# Patient Record
Sex: MALE | Race: WHITE | NOT HISPANIC OR LATINO | Employment: FULL TIME
[De-identification: names, ages, dates, MRNs, and addresses within clinical notes are randomized per-mention and may not be internally consistent; named-entity substitution may affect disease eponyms.]

---

## 2017-03-10 ENCOUNTER — RECORDS - HEALTHEAST (OUTPATIENT)
Dept: ADMINISTRATIVE | Facility: OTHER | Age: 32
End: 2017-03-10

## 2017-09-21 ENCOUNTER — COMMUNICATION - HEALTHEAST (OUTPATIENT)
Dept: SCHEDULING | Facility: CLINIC | Age: 32
End: 2017-09-21

## 2018-05-31 ENCOUNTER — OFFICE VISIT - HEALTHEAST (OUTPATIENT)
Dept: FAMILY MEDICINE | Facility: CLINIC | Age: 33
End: 2018-05-31

## 2018-05-31 ENCOUNTER — RECORDS - HEALTHEAST (OUTPATIENT)
Dept: ADMINISTRATIVE | Facility: OTHER | Age: 33
End: 2018-05-31

## 2018-05-31 ENCOUNTER — COMMUNICATION - HEALTHEAST (OUTPATIENT)
Dept: SCHEDULING | Facility: CLINIC | Age: 33
End: 2018-05-31

## 2018-05-31 DIAGNOSIS — R10.9 ABDOMINAL PAIN: ICD-10-CM

## 2018-05-31 LAB
ALBUMIN UR-MCNC: NEGATIVE MG/DL
APPEARANCE UR: CLEAR
BACTERIA #/AREA URNS HPF: ABNORMAL HPF
BILIRUB UR QL STRIP: NEGATIVE
COLOR UR AUTO: YELLOW
GLUCOSE UR STRIP-MCNC: NEGATIVE MG/DL
HGB UR QL STRIP: ABNORMAL
KETONES UR STRIP-MCNC: NEGATIVE MG/DL
LEUKOCYTE ESTERASE UR QL STRIP: NEGATIVE
MUCOUS THREADS #/AREA URNS LPF: ABNORMAL LPF
NITRATE UR QL: NEGATIVE
PH UR STRIP: 7 [PH] (ref 5–8)
RBC #/AREA URNS AUTO: ABNORMAL HPF
SP GR UR STRIP: 1.01 (ref 1–1.03)
SQUAMOUS #/AREA URNS AUTO: ABNORMAL LPF
UROBILINOGEN UR STRIP-ACNC: ABNORMAL
WBC #/AREA URNS AUTO: ABNORMAL HPF
WBC: 7 THOU/UL (ref 4–11)

## 2018-06-01 ENCOUNTER — OFFICE VISIT - HEALTHEAST (OUTPATIENT)
Dept: FAMILY MEDICINE | Facility: CLINIC | Age: 33
End: 2018-06-01

## 2018-06-01 ENCOUNTER — AMBULATORY - HEALTHEAST (OUTPATIENT)
Dept: ADMINISTRATIVE | Facility: CLINIC | Age: 33
End: 2018-06-01

## 2018-06-01 ENCOUNTER — COMMUNICATION - HEALTHEAST (OUTPATIENT)
Dept: SCHEDULING | Facility: CLINIC | Age: 33
End: 2018-06-01

## 2018-06-01 DIAGNOSIS — R10.9 RIGHT SIDED ABDOMINAL PAIN: ICD-10-CM

## 2018-06-01 DIAGNOSIS — R10.9 ABDOMINAL PAIN: ICD-10-CM

## 2019-01-08 ENCOUNTER — COMMUNICATION - HEALTHEAST (OUTPATIENT)
Dept: FAMILY MEDICINE | Facility: CLINIC | Age: 34
End: 2019-01-08

## 2019-01-24 ENCOUNTER — COMMUNICATION - HEALTHEAST (OUTPATIENT)
Dept: TELEHEALTH | Facility: CLINIC | Age: 34
End: 2019-01-24

## 2019-01-24 ENCOUNTER — COMMUNICATION - HEALTHEAST (OUTPATIENT)
Dept: HEALTH INFORMATION MANAGEMENT | Facility: CLINIC | Age: 34
End: 2019-01-24

## 2019-08-05 ENCOUNTER — OFFICE VISIT (OUTPATIENT)
Dept: ORTHOPEDICS | Facility: CLINIC | Age: 34
End: 2019-08-05
Payer: COMMERCIAL

## 2019-08-05 ENCOUNTER — ANCILLARY PROCEDURE (OUTPATIENT)
Dept: GENERAL RADIOLOGY | Facility: CLINIC | Age: 34
End: 2019-08-05
Attending: FAMILY MEDICINE
Payer: COMMERCIAL

## 2019-08-05 VITALS
BODY MASS INDEX: 26.77 KG/M2 | HEIGHT: 73 IN | DIASTOLIC BLOOD PRESSURE: 71 MMHG | HEART RATE: 63 BPM | SYSTOLIC BLOOD PRESSURE: 153 MMHG | WEIGHT: 202 LBS

## 2019-08-05 DIAGNOSIS — M25.511 ACUTE PAIN OF RIGHT SHOULDER: Primary | ICD-10-CM

## 2019-08-05 DIAGNOSIS — M25.511 ACUTE PAIN OF RIGHT SHOULDER: ICD-10-CM

## 2019-08-05 ASSESSMENT — MIFFLIN-ST. JEOR: SCORE: 1925.15

## 2019-08-05 NOTE — PROGRESS NOTES
"      SPORTS & ORTHOPEDIC WALK-IN VISIT 8/5/2019    Primary Care Physician: Dr. Nichols    8/3/19 - Picked daughter up to put her on his shoulder, and noticed pain immediately while lifting over his head.  Pain is located mostly anterior. He notices his back/posterior muscles tightening to adjust for the pain. Woke up with some pain/discomfort. Notices acute pain when hand is overhead but only with certain movements and not all of the time. Had some previous tendonitis from College FB but no other prior shoulder injury. No radiation of pain into arm. No tingling, numbness.     Reason for visit:     What part of your body is injured / painful?  right shoulder    What caused the injury /pain? Picking up daughter to put on shoulder    How long ago did your injury occur or pain begin? several days ago 8/3/19    What are your most bothersome symptoms? Pain    How would you characterize your symptom?  sharp    What makes your symptoms better? Rest    What makes your symptoms worse? Movement    Have you been previously seen for this problem? No    Medical History:    Any recent changes to your medical history? No    Any new medication prescribed since last visit? No    Have you had surgery on this body part before? 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 No    Do you have any vision problems? No    Do you have any chest pain or edema? No    Do you have any shortness of breath or wheezing?  No    Do you have stomach problems? No    Do you have any numbness or focal weakness? No    Do you have diabetes? No    Do you have problems with bleeding or clotting? No    Do you have an rashes or other skin lesions? No         Past Medical History, Current Medications, and Allergies are reviewed in the electronic medical record as appropriate.       EXAM:BP (!) 153/71   Pulse 63   Ht 1.854 m (6' 1\")   Wt 91.6 kg " (202 lb)   BMI 26.65 kg/m      EXAM:  Alert, pleasant and conversational    Neck with full AROM, non-tender to midline cervical and upper thoracic spine palpation, negative Spurling's.  Elbow with FROM and non-tender to palpation      right shoulder:   Skin intact. No skin changes, deformity or atrophy    AROM: Forward Flexion 180 , Abduction 180 , External rotation approximately 70  Internal rotation to T7. negative tenderness with [forward flexion and internal rotation].   symmetric ROM compared to uninjured side    Strength testing: Abduction: 5/5, Abduction with 30  horizontal flexion and internal rotation: 5/5, External rotation: 5/5, Internal rotation 5/5.   Deltoids 5/5, Biceps 5/5, Triceps 5/5,  5/5.    Palpation: negative TTP of the Acromioclavicular joint  negative TTP of Sternoclavicular joint  negative TTP of posterior glenoid  negative TTP of scapular borders  positive  TTP of the bicipital tendon.     Special Tests: negative King test  negative Neer's test.   negativeO'Finesse's test   negative Speed's test.    Neurovascularly intact bilateral upper extremities.      Imaging: xrays of right shoulder performed and reviewed independently demonstrating no acute fracture or dislocation. See EMR for formal radiology report.     Assessment: Patient is a 33 year old male with anterior right shoulder pain concerning for biceps tendinitis    Recommendations:   Reviewed imaging and assessment with patient in detail  Recommended treatment with course of nsaids, ice, and relative rest  Given HEP. Patient will call if he would like to try pt or a steroid injection  Follow up in 4-6 weeks if not better.     MD Nixon Tierney MD

## 2019-08-05 NOTE — Clinical Note
8/5/2019       RE: Erasto Torres  [address]  [facility] [address]     Dear Colleague,    Thank you for referring your patient, Erasto Torres, to the [facility] SPORTS AND ORTHOPAEDIC WALK IN CLINIC at [facility]. Please see a copy of my visit note below.          SPORTS & ORTHOPEDIC WALK-IN VISIT 8/5/2019    Primary Care Physician: Dr. Nichols    8/3/19 - Picked daughter up to put her on his shoulder, and noticed pain immediately.    Pain is located mostly anterior. He notices his back/posterior muscles tightening to adjust for the pain. Woke up with some pain/discomfort. Notices acute pain when hand is overhead.    Previous tendonitis from [employer]. Noticed mild pins and needles into his pinky (unsure if it is related).    Job is slightly affected by shoulder pain.    Reason for visit:     What part of your body is injured / painful?  right shoulder    What caused the injury /pain? Picking up daughter to put on shoulder    How long ago did your injury occur or pain begin? several days ago 8/3/19    What are your most bothersome symptoms? Pain    How would you characterize your symptom?  sharp    What makes your symptoms better? Rest    What makes your symptoms worse? Movement    Have you been previously seen for this problem? No    Medical History:    Any recent changes to your medical history? No    Any new medication prescribed since last visit? No    Have you had surgery on this body part before? 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 No    Do you have any vision problems? No    Do you have any chest pain or edema? No    Do you have any shortness of breath or wheezing?  No    Do you have stomach problems? No    Do you have any numbness or focal weakness? No    Do you have diabetes? No    Do you have problems with bleeding or clotting? No    Do you have  "an rashes or other skin lesions? No         Past Medical History, Current Medications, and Allergies are reviewed in the electronic medical record as appropriate.       EXAM:BP (!) 153/71   Pulse 63   Ht 1.854 m (6' 1\")   Wt 91.6 kg (202 lb)   BMI 26.65 kg/m       EXAM:  Alert, pleasant and conversational    Neck with full AROM, non-tender to midline cervical and upper thoracic spine palpation, negative Spurling's.  Elbow with FROM and non-tender to palpation      right shoulder:   Skin intact. No skin changes, deformity or atrophy    AROM: Forward Flexion 180 , Abduction 180 , External rotation approximately 70  Internal rotation to T7. negative tenderness with [forward flexion and internal rotation].   symmetric ROM compared to uninjured side    Strength testing: Abduction: 5/5, Abduction with 30  horizontal flexion and internal rotation: 5/5, External rotation: 5/5, Internal rotation 5/5.   Deltoids 5/5, Biceps 5/5, Triceps 5/5,  5/5.    Palpation: negative TTP of the Acromioclavicular joint  negative TTP of Sternoclavicular joint  negative TTP of posterior glenoid  negative TTP of scapular borders  positive  TTP of the bicipital tendon.     Special Tests: negative King test  negative Neer's test.   negativeO'Finesse's test   negative Speed's test.    Neurovascularly intact bilateral upper extremities.      Imaging: xrays of right shoulder performed and reviewed independently demonstrating no acute fracture or dislocation. See EMR for formal radiology report.     Assessment: Patient is a 33 year old male with anterior right shoulder pain concerning for biceps tendinitis    Recommendations: ***      Nxion Rand MD                Again, thank you for allowing me to participate in the care of your patient.      Sincerely,    Nixon Rand MD    "

## 2019-10-07 ENCOUNTER — COMMUNICATION - HEALTHEAST (OUTPATIENT)
Dept: SCHEDULING | Facility: CLINIC | Age: 34
End: 2019-10-07

## 2019-10-08 ENCOUNTER — OFFICE VISIT - HEALTHEAST (OUTPATIENT)
Dept: FAMILY MEDICINE | Facility: CLINIC | Age: 34
End: 2019-10-08

## 2019-10-08 DIAGNOSIS — R53.83 FATIGUE, UNSPECIFIED TYPE: ICD-10-CM

## 2019-10-08 DIAGNOSIS — R50.9 FEVER, UNSPECIFIED FEVER CAUSE: ICD-10-CM

## 2019-10-08 LAB
BASOPHILS # BLD AUTO: 0 THOU/UL (ref 0–0.2)
BASOPHILS NFR BLD AUTO: 1 % (ref 0–2)
EOSINOPHIL # BLD AUTO: 0.3 THOU/UL (ref 0–0.4)
EOSINOPHIL NFR BLD AUTO: 6 % (ref 0–6)
ERYTHROCYTE [DISTWIDTH] IN BLOOD BY AUTOMATED COUNT: 12.5 % (ref 11–14.5)
HCT VFR BLD AUTO: 41.1 % (ref 40–54)
HGB BLD-MCNC: 14 G/DL (ref 14–18)
LYMPHOCYTES # BLD AUTO: 0.9 THOU/UL (ref 0.8–4.4)
LYMPHOCYTES NFR BLD AUTO: 20 % (ref 20–40)
MCH RBC QN AUTO: 28 PG (ref 27–34)
MCHC RBC AUTO-ENTMCNC: 34.1 G/DL (ref 32–36)
MCV RBC AUTO: 82 FL (ref 80–100)
MONOCYTES # BLD AUTO: 0.6 THOU/UL (ref 0–0.9)
MONOCYTES NFR BLD AUTO: 13 % (ref 2–10)
NEUTROPHILS # BLD AUTO: 2.6 THOU/UL (ref 2–7.7)
NEUTROPHILS NFR BLD AUTO: 60 % (ref 50–70)
PLATELET # BLD AUTO: 213 THOU/UL (ref 140–440)
PMV BLD AUTO: 7.8 FL (ref 7–10)
RBC # BLD AUTO: 5.01 MILL/UL (ref 4.4–6.2)
WBC: 4.3 THOU/UL (ref 4–11)

## 2019-10-08 ASSESSMENT — MIFFLIN-ST. JEOR: SCORE: 1899.21

## 2020-05-13 ENCOUNTER — COMMUNICATION - HEALTHEAST (OUTPATIENT)
Dept: FAMILY MEDICINE | Facility: CLINIC | Age: 35
End: 2020-05-13

## 2020-05-13 DIAGNOSIS — Z20.822 EXPOSURE TO COVID-19 VIRUS: ICD-10-CM

## 2020-05-14 ENCOUNTER — AMBULATORY - HEALTHEAST (OUTPATIENT)
Dept: LAB | Facility: CLINIC | Age: 35
End: 2020-05-14

## 2020-05-14 DIAGNOSIS — Z20.822 EXPOSURE TO COVID-19 VIRUS: ICD-10-CM

## 2020-05-17 ENCOUNTER — COMMUNICATION - HEALTHEAST (OUTPATIENT)
Dept: FAMILY MEDICINE | Facility: CLINIC | Age: 35
End: 2020-05-17

## 2020-08-03 ENCOUNTER — COMMUNICATION - HEALTHEAST (OUTPATIENT)
Dept: FAMILY MEDICINE | Facility: CLINIC | Age: 35
End: 2020-08-03

## 2020-09-03 ENCOUNTER — OFFICE VISIT - HEALTHEAST (OUTPATIENT)
Dept: FAMILY MEDICINE | Facility: CLINIC | Age: 35
End: 2020-09-03

## 2020-09-03 DIAGNOSIS — M25.562 CHRONIC PAIN OF LEFT KNEE: ICD-10-CM

## 2020-09-03 DIAGNOSIS — G89.29 CHRONIC PAIN OF LEFT KNEE: ICD-10-CM

## 2020-09-03 ASSESSMENT — MIFFLIN-ST. JEOR: SCORE: 1878.86

## 2020-09-10 ENCOUNTER — RECORDS - HEALTHEAST (OUTPATIENT)
Dept: ADMINISTRATIVE | Facility: OTHER | Age: 35
End: 2020-09-10

## 2020-09-24 ENCOUNTER — RECORDS - HEALTHEAST (OUTPATIENT)
Dept: ADMINISTRATIVE | Facility: OTHER | Age: 35
End: 2020-09-24

## 2020-12-05 ENCOUNTER — AMBULATORY - HEALTHEAST (OUTPATIENT)
Dept: NURSING | Facility: CLINIC | Age: 35
End: 2020-12-05

## 2021-02-09 ENCOUNTER — COMMUNICATION - HEALTHEAST (OUTPATIENT)
Dept: FAMILY MEDICINE | Facility: CLINIC | Age: 36
End: 2021-02-09

## 2021-02-12 ENCOUNTER — AMBULATORY - HEALTHEAST (OUTPATIENT)
Dept: NURSING | Facility: CLINIC | Age: 36
End: 2021-02-12

## 2021-03-05 ENCOUNTER — COMMUNICATION - HEALTHEAST (OUTPATIENT)
Dept: FAMILY MEDICINE | Facility: CLINIC | Age: 36
End: 2021-03-05

## 2021-03-08 ENCOUNTER — COMMUNICATION - HEALTHEAST (OUTPATIENT)
Dept: FAMILY MEDICINE | Facility: CLINIC | Age: 36
End: 2021-03-08

## 2021-03-09 ENCOUNTER — COMMUNICATION - HEALTHEAST (OUTPATIENT)
Dept: FAMILY MEDICINE | Facility: CLINIC | Age: 36
End: 2021-03-09

## 2021-03-09 ENCOUNTER — AMBULATORY - HEALTHEAST (OUTPATIENT)
Dept: FAMILY MEDICINE | Facility: CLINIC | Age: 36
End: 2021-03-09

## 2021-03-09 ENCOUNTER — COMMUNICATION - HEALTHEAST (OUTPATIENT)
Dept: SCHEDULING | Facility: CLINIC | Age: 36
End: 2021-03-09

## 2021-03-09 DIAGNOSIS — Z71.85 VACCINE COUNSELING: ICD-10-CM

## 2021-03-10 ENCOUNTER — AMBULATORY - HEALTHEAST (OUTPATIENT)
Dept: NURSING | Facility: CLINIC | Age: 36
End: 2021-03-10

## 2021-03-10 DIAGNOSIS — Z23 ENCOUNTER FOR IMMUNIZATION: ICD-10-CM

## 2021-05-06 ENCOUNTER — OFFICE VISIT - HEALTHEAST (OUTPATIENT)
Dept: FAMILY MEDICINE | Facility: CLINIC | Age: 36
End: 2021-05-06

## 2021-05-06 ENCOUNTER — AMBULATORY - HEALTHEAST (OUTPATIENT)
Dept: FAMILY MEDICINE | Facility: CLINIC | Age: 36
End: 2021-05-06

## 2021-05-06 DIAGNOSIS — Z20.822 SUSPECTED COVID-19 VIRUS INFECTION: ICD-10-CM

## 2021-05-07 LAB
SARS-COV-2 PCR COMMENT: NORMAL
SARS-COV-2 RNA SPEC QL NAA+PROBE: NEGATIVE
SARS-COV-2 VIRUS SPECIMEN SOURCE: NORMAL

## 2021-05-08 ENCOUNTER — COMMUNICATION - HEALTHEAST (OUTPATIENT)
Dept: SCHEDULING | Facility: CLINIC | Age: 36
End: 2021-05-08

## 2021-06-01 VITALS — BODY MASS INDEX: 25.27 KG/M2 | WEIGHT: 191.5 LBS

## 2021-06-01 VITALS — BODY MASS INDEX: 25.07 KG/M2 | WEIGHT: 190 LBS

## 2021-06-02 NOTE — TELEPHONE ENCOUNTER
"RN triage call  Patient is calling to report that he has felt \"ill\" since Thursday. Body aches, feverish feelings and chills.  He can get out of bed for 20 min and has to go lay down again.    Has woken up soaking wet whether napping or sleeping at night.  No vomiting or diarrhea, no abdominal pain  No appetite  Is taking in fluids, urinating well      Does get some dizziness with getting up, has to get up slow.Has not fallen. Pulse is 72 bpm  Headaches but not today  Headaches were worse on Saturday or Sunday  Managing symptoms  with tylenol and advil  No chest pain, no shortness of breath  Does have a cough that developed last night, feels a tickle in throat and feels pain in throat with coughing. Feels the cough is from his throat not his chest.  No pain with swallowing.  Patient states that this reminds him of having [place] a few years ago.  Gave disposition to be seen tonight in 4 hours. Patient was hesitant and would rather schedule an appointment with his clinic.  Did review signs and symptoms of when to call back. Patient stated understanding.  Patient was warm transferred to scheduling and has appointment in the morning.  Francisca Navarro, RN  [facility] Triage Nurse  6:59 PM  10/7/2019        Reason for Disposition    [1] MODERATE weakness (i.e., interferes with work, school, normal activities) AND [2] cause unknown  (Exceptions: weakness with acute minor illness, or weakness from poor fluid intake)    Protocols used: WEAKNESS (GENERALIZED) AND FATIGUE-A-AH      "

## 2021-06-02 NOTE — PROGRESS NOTES
"Chief Complaint:  Chief Complaint   Patient presents with     Fatigue     Cough     Fever     Chills     Headache       HPI:   Erasto Torres is a 34 y.o. male here with chief complaint of fevers and fatigue.  Symptoms started last Thursday, about 5 days ago.  He says afternoon on Thursday he started having \"violent shivering.\"  Friday he felt achy all over.  He says normally when he gets sick it lasts a few days like this, then gets better pretty quickly.  However the achiness has persisted.  He feels very weak and tired.  He is spending most of the day in bed.  He did have a headache for 2 days but that has resolved.  He is feeling slightly dizzy when he standing up quickly.  Cough started 1 day ago.  Appetite is poor.  No sore throat.  He notes that he often works really hard and kind of gets burnt out and get sick for a few days.  He is wondering if that is what this is.  He had mononucleosis several years ago.  He says symptoms today feels somewhat similar to when he had mono in the past.  He is not on any fever reducing medicines right now.      Current Outpatient Medications:      ibuprofen (ADVIL,MOTRIN) 600 MG tablet, Take 600 mg by mouth., Disp: , Rfl:     Physical Exam:  Vitals:    10/08/19 0943   BP: 110/62   Pulse: 68   Resp: 16     Body mass index is 25.69 kg/m .    Vital signs stable as recorded above  General: Patient is alert and oriented x 3, in no apparent distress, appropriately groomed with normal affect  Eyes: PERRL, EOMI bilaterally, no nystagmus  Ears: TMs non-erythematous with good light reflex bilaterally  Throat: no erythema, edema, or exudate noted  Lymphatic: No cervical lymph node enlargement  Cardiac: Regular rate and rhythm; no murmurs  Pulmonary: Lung clear to auscultation bilaterally; no crackles, rales, rhonchi, or wheezing noted  Musculoskeletal: Normal 4/5 strength in major muscle groups in upper and lower extremities bilaterally, normal  strength  Neuro: Cranial Nerves 2 " through 12 grossly intact, negative Romberg Test, patient walks in a normal tandem gait, normal finger-to-nose cerebellar coordination testing  Abdomen: Non tender to palpation, possible minimal splenomegaly, negative Martinez's sign, no pain over McBurney's point, positive bowel sounds, no masses palpable    Results for orders placed or performed in visit on 10/08/19   HM1 (CBC with Diff)   Result Value Ref Range    WBC 4.3 4.0 - 11.0 thou/uL    RBC 5.01 4.40 - 6.20 mill/uL    Hemoglobin 14.0 14.0 - 18.0 g/dL    Hematocrit 41.1 40.0 - 54.0 %    MCV 82 80 - 100 fL    MCH 28.0 27.0 - 34.0 pg    MCHC 34.1 32.0 - 36.0 g/dL    RDW 12.5 11.0 - 14.5 %    Platelets 213 140 - 440 thou/uL    MPV 7.8 7.0 - 10.0 fL    Neutrophils % 60 50 - 70 %    Lymphocytes % 20 20 - 40 %    Monocytes % 13 (H) 2 - 10 %    Eosinophils % 6 0 - 6 %    Basophils % 1 0 - 2 %    Neutrophils Absolute 2.6 2.0 - 7.7 thou/uL    Lymphocytes Absolute 0.9 0.8 - 4.4 thou/uL    Monocytes Absolute 0.6 0.0 - 0.9 thou/uL    Eosinophils Absolute 0.3 0.0 - 0.4 thou/uL    Basophils Absolute 0.0 0.0 - 0.2 thou/uL       Assessment/Plan:  1.  Fever  2.  Fatigue  Differential includes most likely viral illness.  Exam and lab work is generally reassuring.  No acute concerns.    Discussed continued symptomatic treatment.  Past history of mono several years ago.  Patient declined chest x-ray.  He will continue to monitor symptoms.    Follow-up in 1 to 2 weeks if no better, sooner if worsening.    This dictation uses voice recognition software, which may contain typographical errors.

## 2021-06-03 VITALS
HEIGHT: 73 IN | RESPIRATION RATE: 16 BRPM | TEMPERATURE: 98 F | BODY MASS INDEX: 26.11 KG/M2 | HEART RATE: 68 BPM | SYSTOLIC BLOOD PRESSURE: 110 MMHG | WEIGHT: 197 LBS | DIASTOLIC BLOOD PRESSURE: 62 MMHG

## 2021-06-04 VITALS
DIASTOLIC BLOOD PRESSURE: 68 MMHG | WEIGHT: 194 LBS | TEMPERATURE: 97.7 F | RESPIRATION RATE: 16 BRPM | BODY MASS INDEX: 25.71 KG/M2 | HEIGHT: 73 IN | HEART RATE: 60 BPM | SYSTOLIC BLOOD PRESSURE: 104 MMHG

## 2021-06-08 NOTE — TELEPHONE ENCOUNTER
Dr. Gonzalez, can you please order this patient the serology testing so that he can get scheduled? Thank you

## 2021-06-11 NOTE — PROGRESS NOTES
Adult Physical:    CC:  cpe  Knee pain for about 2 months.    HPI:  Left knee  Clicks and pops.  Quite a bit of pain  Has taken no meds.  Sometimes cannot sleep due to pain.  Hx of FB in college but never an injury.    Past Medical History:  Past Medical History:   Diagnosis Date     Mononucleosis      Past Surgical History:  Past Surgical History:   Procedure Laterality Date     ANKLE SURGERY       ORIF FINGER / THUMB FRACTURE Right      Medicines:  Outpatient Medications Prior to Visit   Medication Sig Dispense Refill     ibuprofen (ADVIL,MOTRIN) 600 MG tablet Take 600 mg by mouth.       No facility-administered medications prior to visit.        Allergies:  No Known Allergies    Family History:  Family History   Problem Relation Age of Onset     No Medical Problems Daughter      Hypertension Mother      Hyperlipidemia Mother      Diabetes Maternal Grandmother        Social History:  Works as in school behavioral therapist.  ,   Two children.  Expecting another in February.    Social History     Socioeconomic History     Marital status:      Spouse name: Not on file     Number of children: Not on file     Years of education: Not on file     Highest education level: Not on file   Occupational History     Not on file   Social Needs     Financial resource strain: Not on file     Food insecurity     Worry: Not on file     Inability: Not on file     Transportation needs     Medical: Not on file     Non-medical: Not on file   Tobacco Use     Smoking status: Never Smoker     Smokeless tobacco: Never Used     Tobacco comment: no second hand smoke exposure   Substance and Sexual Activity     Alcohol use: No     Drug use: Not on file     Sexual activity: Yes     Partners: Female   Lifestyle     Physical activity     Days per week: Not on file     Minutes per session: Not on file     Stress: Not on file   Relationships     Social connections     Talks on phone: Not on file     Gets together: Not on file      "Attends Voodoo service: Not on file     Active member of club or organization: Not on file     Attends meetings of clubs or organizations: Not on file     Relationship status: Not on file     Intimate partner violence     Fear of current or ex partner: Not on file     Emotionally abused: Not on file     Physically abused: Not on file     Forced sexual activity: Not on file   Other Topics Concern     Not on file   Social History Narrative    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   7    Review of Systems:  10 point review ok except for HPI.      Physical Exam:  /68 (Patient Site: Right Arm, Patient Position: Sitting, Cuff Size: Adult Large)   Pulse 60   Temp 97.7  F (36.5  C) (Oral)   Resp 16   Ht 6' 1\" (1.854 m)   Wt 194 lb (88 kg)   BMI 25.60 kg/m    Gen:   Alert, not distressed  Head:   Normocephalic, without obvious abnormality, atraumatic  Eyes:   PERRL, conjunctiva/corneas clear, EOM's intact  Ears:   Normal tympanic membranes and external ear canals  Nose:    Mucosa normal, no drainage or sinus tenderness  Throat:    No erythema or exudates  Neck:    No adenopathy no nodules in thyroid, normal ROM  Lungs:    Clear to auscultation bilaterally, respirations unlabored  Chest wall:  No tenderness or deformity  Heart:     Regular, normal S1 and S2, no murmur, gallop or rub  Abdomen:  Soft, non-tender, no masses, no organomegaly  Back:    Symmetric, no curvature, ROM normal, no CVA tenderness  Extremities: Extremities normal, atraumatic, no cyanosis or edema  Skin:   Skin color, texture, turgor normal, no rashes or lesions  Lymph nodes: Cervical, and supraclavicular, nodes normal  Neurologic: CNII-XII intact.   DTRs normal and symmetric.  Symmetric strength and sensation.    Knee Left     No effusion  No erythema  No warmth    No tenderness to palpation    Lachmans: negative   Anterior drawer: negative     Varus stress: non tender  Valgus stress: nontender    Modified " Appleys:  negative     Patellar grind: negative      Immunizations Due:  He will return for flu shot later in fall.    Tobacco Cessation:   N/a    Cancer Screening:  Nothing due    Weight management:   The patient was counseled regarding nutrition and physical activity    Assessment and Plan:  Well 34 y.o. man.    1. Chronic pain of left knee  Suspect meniscal injury/bucket handle tear.  Advised follow up with Orthopedics  Referred.  - Ambulatory referral to Orthopedics

## 2021-06-15 NOTE — TELEPHONE ENCOUNTER
Pt needs order entered for 2nd covid vaccine.  Missed previously scheduled appt 3/5/21 because wife gave birth.  1st covid vaccine rec'd 21.    Pt will be bringing  to clinic today for 10 am with Dr Gonzalez.  At that time pt will confirm that an order has been entered for his own 2nd covid vaccine.    Thank you-    Adri Salas RN  [facility] Triage     Reason for Disposition    [1] Follow-up call from patient regarding patient's clinical status AND [2] information NON-URGENT    Protocols used: PCP CALL - NO TRIAGE-A-

## 2021-06-17 NOTE — PATIENT INSTRUCTIONS - HE
Dear Erasto Torrse,    Your symptoms show that you may have coronavirus (COVID-19). Your risk is very low given your vaccination status and your ill contacts have had negative tests but it is prudent to test you while we are learning more about it all.     Will I be tested for COVID-19?  We would like to test you for Covid-19 virus. I have placed orders for this test.     To schedule: go to your [employer] home page and scroll down to the section that says  You have an appointment that needs to be scheduled  and click the large green button that says  Schedule Now  and follow the steps to find the next available openings.    If you are unable to complete these [employer] scheduling steps, please call [phone] to schedule your testing.     Return to work/school/ guidance:  Please let your workplace manager and staffing office know when your quarantine ends     We can t give you an exact date as it depends on the above. You can calculate this on your own or work with your manager/staffing office to calculate this. (For example if you were exposed on 10/4, you would have to quarantine for 14 full days. That would be through 10/18. You could return on 10/19.)      If you receive a positive COVID-19 test result, follow the guidance of the those who are giving you the results. Usually the return to work is 10 (or in some cases 20 days from symptom onset.) If you work at [facility], you must also be cleared by Employee Occupational Health and Safety to return to work.        If you receive a negative COVID-19 test result and did not have a high risk exposure to someone with a known positive COVID-19 test, you can return to work once you're free of fever for 24 hours without fever-reducing medication and your symptoms are improving or resolved.      If you receive a negative COVID-19 test and If you had a high risk exposure to someone who has tested positive for COVID-19 then you can return to work 14 days  after your last contact with the positive individual    Note: If you have ongoing exposure to the covid positive person, this quarantine period may be more than 14 days. (For example, if you are continued to be exposed to your child who tested positive and cannot isolate from them, then the quarantine of 7-14 days can't start until your child is no longer contagious. This is typically 10 days from onset of the child's symptoms. So the total duration may be 17-24 days in this case.)    Sign up for [employer].   We know it's scary to hear that you might have COVID-19. We want to track your symptoms to make sure you're okay over the next 2 weeks. Please look for an email from [employer]--this is a free, online program that we'll use to keep in touch. To sign up, follow the link in the email you will receive. Learn more at [email or web address]    How can I take care of myself?    Get lots of rest. Drink extra fluids (unless a doctor has told you not to)    Take Tylenol (acetaminophen) or ibuprofen for fever or pain. If you have liver or kidney problems, ask your family doctor if it's okay to take Tylenol o ibuprofen    If you have other health problems (like cancer, heart failure, an organ transplant or severe kidney disease): Call your specialty clinic if you don't feel better in the next 2 days.    Know when to call 911. Emergency warning signs include:  o Trouble breathing or shortness of breath  o Pain or pressure in the chest that doesn't go away  o Feeling confused like you haven't felt before, or not being able to wake up  o Bluish-colored lips or face    Where can I get more information?  [facility] - About COVID-19:   [email or web address]    CDC - What to Do If You're Sick:   www.cdc.gov/coronavirus/2019-ncov/about/steps-when-sick.html        May 6, 2021  RE:  Erasto Torres                                                                                                                   [address]  [place] [address]      To whom it may concern:    I evaluated Erasto Torres on 05/06/21. Erasto Torres should be excused from work/school.     They should let their workplace manager and staffing office know when their quarantine ends.    We can not give an exact date as it depends on the information below. They can calculate this on their own or work with their manager/staffing office to calculate this. (For example if they were exposed on 10/04, they would have to quarantine for 14 full days. That would be through 10/18. They could return on 10/19.)    Quarantine Guidelines:      If patient receives a positive COVID-19 test result, they should follow the guidance of those who are giving the results. Usually the return to work is 10 (or in some cases 20 days from symptom onset.) If they work at [employer], they must be cleared by Employee Occupational Health and Safety to return to work.        If patient receives a negative COVID-19 test result and did not have a high risk exposure to someone with a known positive COVID-19 test, they can return to work once they're free of fever for 24 hours without fever-reducing medication and their symptoms are improving or resolved.      If patient receives a negative COVID-19 test and if they had a high risk exposure to someone who has tested positive for COVID-19 then they can return to work 14 days after their last contact with the positive individual    Note: If there is ongoing exposure to the covid positive person, this quarantine period may be longer than 14 days. (For example, if they are continually exposed to their child, who tested positive and cannot isolate from them, then the quarantine of 7-14 days can't start until their child is no longer contagious. This is typically 10 days from onset to the child's symptoms. So the total duration may be 17-24 days in this case.)    Sincerely,  Radha Adame,  MD

## 2021-06-18 NOTE — PROGRESS NOTES
"  Subjective:    Erasto Torres is a 32 y.o. male who presents for evaluation of follow-up walk-in care for right abdominal pain.  I reviewed walk-in care note, labs, and CT scan report today.  He presented to walk-in care yesterday with abdominal pain for 2 days.  Sometimes pain was in the epigastric area, but now it seemed more concentrated on the right side of his abdomen.  Additionally he had some loose stools.  Normal white blood cell count, blood present in urine.  CT scan was done for possible kidney stones.  Scan was grossly normal, with small possibility of inflamed appendix.  Provider reviewed results with surgeon and radiologist.  Because of his none acute symptoms, they felt that appendix finding was likely normal variant.  Provider felt that it viral gastroenteritis was probably the most likely diagnosis.  He sent patient home with symptomatic treatment.  He strongly encouraged patient to follow-up within 24 hours, and that is why he is here today.  Initially he says he feels like pain is the same today as it was yesterday.  However with reflection, he notes pain has improved a little since yesterday.  He says pain is fairly constant, worse after eating.  He does have somewhat of an appetite.  No fever, no vomiting.  Maybe having slightly fewer bowel movements than normal.  He did have a bad headache yesterday, but that has resolved completely.  He is here today with his partner, who notes that patient has a \"high pain tolerance.\"  No history of abdominal surgeries.  No pain with urination, no increased urinary frequency.  Pain has been staying in the right lateral and lower quadrants of the abdomen.  It is not moving around.      Current Outpatient Prescriptions:      ibuprofen (ADVIL,MOTRIN) 600 MG tablet, Take 600 mg by mouth., Disp: , Rfl:      Objective:   Allergies:  Review of patient's allergies indicates no known allergies.    Vitals:  Vitals:    06/01/18 1449   BP: 138/70   Pulse: 62   Temp: " 98.2  F (36.8  C)   SpO2: 97%     Body mass index is 25.07 kg/(m^2).    Vital signs reviewed.  General: Patient is alert and oriented x 3, in no apparent distress, sitting comfortably in exam room, properly groomed with normal affect  Cardiac: regular rate and rhythm, no murmurs  Pulmonary: lungs clear to auscultation bilaterally, no crackles, rales, rhonchi, or wheezing noted  Abdomen: Mild pain with palpation in the right middle to lower quadrant, otherwise no pain elsewhere, no hepatosplenomegaly, negative Martinez's sign, no pain over McBurney's point, positive bowel sounds ×4, no masses palpable  Musculoskeletal: No CVA tenderness bilaterally  Skin: Skin of the abdomen is healthy normal, no rashes, erythema, or warmth    Results for orders placed or performed in visit on 05/31/18   Urinalysis-UC if Indicated   Result Value Ref Range    Color, UA Yellow Colorless, Yellow, Straw, Light Yellow    Clarity, UA Clear Clear    Glucose, UA Negative Negative    Bilirubin, UA Negative Negative    Ketones, UA Negative Negative    Specific Gravity, UA 1.015 1.005 - 1.030    Blood, UA Moderate (!) Negative    pH, UA 7.0 5.0 - 8.0    Protein, UA Negative Negative mg/dL    Urobilinogen, UA 0.2 E.U./dL 0.2 E.U./dL, 1.0 E.U./dL    Nitrite, UA Negative Negative    Leukocytes, UA Negative Negative    Bacteria, UA Few (!) None Seen hpf    RBC, UA 3-5 (!) None Seen, 0-2 hpf    WBC, UA None Seen None Seen, 0-5 hpf    Squam Epithel, UA None Seen None Seen, 0-5 lpf    Mucus, UA Few (!) None Seen lpf   White Blood Count (WBC)   Result Value Ref Range    WBC 7.0 4.0 - 11.0 thou/uL       Assessment and Plan:   1.  Follow-up right lower quadrant pain.  Differential includes most likely viral gastroenteritis, musculoskeletal pain, versus nonspecific abdominal pain.  Less possible would be appendicitis, colitis, cholecystitis, kidney stone, UTI, intestinal bacterial infection, GERD, bowel obstruction.  No significant right lower quadrant pain  or right upper quadrant pain, pain pattern is not consistent with a kidney stone, no UTI symptoms, no significant diarrhea, positive bowel sounds on exam makes obstruction unlikely.  I reviewed with patient and his partner that he does not have an acute abdomen, which is good.  However, I am unable to find a clear source for his pain.  I discussed continued symptomatic treatments, sticking with bland diet, avoiding dairy, rest, and close monitoring.  He could try a GERD medicine to see if that improves his symptoms.  I reviewed reasons to notify us or go to ER, such as increasing abdominal pain, fever, vomiting.  I suspect pain will resolve on its own over the next several days.  He declines referral to GI today.  He will call us next week if pain is still present and could consider GI referral at that time.  He has partner are comfortable with our plan.  No further questions.    This dictation uses voice recognition software, which may contain typographical errors.

## 2021-06-18 NOTE — PROGRESS NOTES
"Subjective:      Patient ID: Erasto Torres is a 32 y.o. male.    Chief Complaint:   Chief Complaint   Patient presents with     Abdominal Pain     points to ruq pain constant when at rest but radiates across abdoment when active. Looser stools yesterday evening and again this morning last ate lunch today at about 1230pm and tolerated without issues     Headache     started this morning.         HPI: Rt sided abd pain starting yesterday, getting worse today, worse with moving around and radiates around to the other side. Some loose stools without diarrhea. Started having a headache this morning; tylenol not effective.  Located bitemporal areas.  Has not had anything like this before.  No N/V.  Ate lunch at 12:30 - tolerated normal amount.  No black/bloody stools.  No ill contacts. Ate mostly at home - no picnics, restaurants etc recently.      Past Medical History:   Diagnosis Date     Mononucleosis        Past Surgical History:   Procedure Laterality Date     ANKLE SURGERY       ORIF FINGER / THUMB FRACTURE Right        Family History   Problem Relation Age of Onset     No Medical Problems Daughter      Hypertension Mother      Hyperlipidemia Mother      Diabetes Maternal Grandmother        Social History   Substance Use Topics     Smoking status: Never Smoker     Smokeless tobacco: Never Used      Comment: no second hand smoke exposure     Alcohol use No       Review of Systems   Constitutional: Positive for appetite change. Negative for activity change, diaphoresis and fever.   HENT: Negative for congestion and ear pain.    Eyes: Negative for pain.   Respiratory: Negative for cough.    Gastrointestinal: Positive for abdominal pain. Negative for abdominal distention, blood in stool, constipation, diarrhea (Stools were \"looser than normal\", but not \"diarrhea\" per patient.), rectal pain and vomiting.   Genitourinary: Negative for decreased urine volume, difficulty urinating, dysuria, flank pain (Questionable - does " "not wrap around to true flank.  No CVA tenderness.), hematuria, penile pain and urgency.        Urine \"darker than normal.\" No elizabeth blood.     Musculoskeletal: Negative for arthralgias.   Neurological: Positive for headaches. Negative for dizziness and numbness.       Objective:     /66 (Patient Site: Right Arm, Patient Position: Sitting)  Pulse 69  Temp 98.6  F (37  C) (Oral)   Resp 16  Wt 191 lb 8 oz (86.9 kg)  SpO2 98%  BMI 25.27 kg/m2    Physical Exam   Constitutional: He is oriented to person, place, and time.   Cardiovascular: Intact distal pulses.    Pulmonary/Chest: Effort normal.   Abdominal: Soft. Bowel sounds are normal. He exhibits no distension and no mass. There is tenderness (RUQ greater than RLQ.  Negative Martinez's sign.  ). There is no rebound and no guarding.   Neurological: He is alert and oriented to person, place, and time.   Skin: No rash noted.   Psychiatric: He has a normal mood and affect.       Assessment:     Procedures    The encounter diagnosis was Abdominal pain.    Plan:       Patient's symptoms are consistent with kidney stones given that the pain tends to be colicky and he has blood in his urine; however, patient is tender in the right upper quadrant all the way down to the right lower quadrant.  Patient has not had no abdominal surgeries nor abdominal issues in the past.  No family history of kidney stones, gallstones or chronic abdominal diseases.  Discussed case with radiologist who stated that a noncontrast CT in this patient given his height and weight should see the appendix as well as a kidney stone, but not see gallstones that were not calcified.  We do not have Dramamine here for kidney stone pain.  Patient advised to be n.p.o. in case this is his appendix.      1520: Patient states his pain is now more localized in the right lower quadrant area and is \"spasming.\"    1610: Radiologist Dr. Lara called to discuss critical findings of possible early appendicitis. "  Recommended obtaining labs and discussing with the surgeon for further management.  1620: Discussed case with Dia CARMEN who will follow up with surgery and take over the case.

## 2021-06-18 NOTE — PROGRESS NOTES
Report hand off from Tatiana Bajwa given about 16:15. Patient having R sided abdominal pain since yesterday. Pain radiation was angled from the side down toward the RLQ. UA showed moderate blood, so a non-contrast abdominal CT was ordered. No sign of hydronephrosis or kidney stone was noted. The appendix did appear dilated on the CT. , radiologist, expressed concern for possible early appendicitis.     I personally spoke to , Hospitalist, recommended to speak to surgery.  I discussed this case with Dr. Erazo of surgery.  Since the patient reported improvement in his abdominal pain from a 6/10 to a 2/10 after CT he recommended getting white blood cell count.  The white count was normal at 7.0.  Abdominal exam was reassessed.  No peritoneal signs were noted, negative Rovsing's and negative obturator signs.    Recommend close follow-up with primary care tomorrow morning.  Possible differential includes viral gastroenteritis still since the diagnosis of appendicitis based on mild dilation of the appendix on noncontrast CT is weak. Patient was scheduled for reassessment in 21 hours.     At the end of the encounter, I discussed results, diagnosis, medications. Discussed red flags for immediate return to clinic/ER, as well as indications for follow up if no improvement. Patient understood and agreed to plan. Patient was stable for discharge.    Radiology:  Ct Abdomen Pelvis Without Oral Without Iv Contrast    Result Date: 5/31/2018  EXAM DATE:         05/31/2018 [facility] CT ABDOMEN, PELVIS W/O CONTRAST 5/31/2018 3:45 PM INDICATION: CT ABD PEL- WITHOUT ORAL AND IV- ABD PAIN. KIDNEY STONES TECHNIQUE: Routine CT abdomen and pelvis without oral or IV contrast. Multiplanar reformation images (MPR). Dose reduction techniques were used. COMPARISON: None. FINDINGS: LUNG BASES: Negative. ABDOMEN: Noncontrast liver, gallbladder, bile ducts, spleen, pancreas, adrenal glands, and kidneys are  negative. No urolithiasis or hydronephrosis. PELVIS: Appendix is dilated to 10 mm and devoid of intraluminal gas. Periappendiceal fat is equivocally mildly inflamed. No pneumoperitoneum or abscess. MUSCULOSKELETAL: Sharply circumscribed benign-appearing subcutaneous cyst of the left paralumbar region measures 4.6 x 1.7 cm on axial images. CONCLUSION: 1.  Findings suspicious for early acute uncomplicated appendicitis. 2.  No urolithiasis or hydronephrosis. I discussed the findings with Tatiana Bajwa CNP at 4:10 PM on 5/31/2018.       Labs:  Recent Results (from the past 24 hour(s))   White Blood Count (WBC)   Result Value Ref Range    WBC 7.0 4.0 - 11.0 thou/uL           1. Abdominal pain  Urinalysis-UC if Indicated    CT Abdomen Pelvis Without Oral Without IV Contrast    White Blood Count (WBC)         Patient Instructions   1. Follow up with primary care tomorrow at 3:00PM for re-evaluation of you abdominal pain.  2. Seek emergency medical attention if you develop high fever, severe abdominal pain, or vomiting.   3. At this time your abdominal pain may be a viral gastroenteritis. Avoiding dairy. Eat a bland diet.

## 2021-06-18 NOTE — LETTER
Letter by Vidya Abarca at      Author: Vidya Abarca Service: -- Author Type: --    Filed:  Encounter Date: 1/24/2019 Status: (Other)       January 24, 2019       Erasto Torres  [address]  [place] [address]    Dear Erasto Torres:    We are pleased to provide you with secure, online access to medical information for you and your family within [facility]. Per your request, we have expanded your account to allow access to the records of the following family members:              Debbie WATTERS Brian (privilege ends on 7/8/2027.)            Eliel VEE Brian (privilege ends on 12/16/2030.)     How Do I Log In?  1. In your Internet browser, go to [employer]  2. Log into [facility] using your [facility] Username and Password.  3. Click Sign In.        How Do I Access a Family Member's Account?  4. Select the account you want to access by clicking the [place] with the appropriate patient's name at the top of your screen.   5. You will see a disclaimer page letting you know that you will be viewing a family member's record. Review the disclaimer and then click Accept Proxy Access Disclaimer to proceed.  6. Once you switch to viewing a family member's record, you can navigate to [facility] pages the same way you would for yourself. You can return to your own account by clicking the [place] at the top of the screen with your name on it.    7. To customize the colors and names of the linked accounts, you can select Personalize from the Profile dropdown menu at the top of the screen, then click the Edit button to make changes.     Additional Information  If you have questions, visit [email or web address], e-mail [email or web address] or call [phone] to talk to our [facility] staff. Remember, [facility] is NOT to be used for urgent needs. For medical emergencies, dial 911.

## 2021-06-19 NOTE — LETTER
Letter by Olivia Hilliard PA-C at      Author: Olivia Hilliard PA-C Service: -- Author Type: --    Filed:  Encounter Date: 10/8/2019 Status: Signed         October 8, 2019     Patient: Erasto Torres   YOB: 1985   Date of Visit: 10/8/2019       To Whom it May Concern:    Erasto Torres was seen in my clinic on 10/8/2019.  Please excuse him from work from 10/7/2019 through 10/9/2019 due to illness.  He may return to work on 10/10/2019 if his symptoms improve.    If you have any questions or concerns, please don't hesitate to call.    Sincerely,         Electronically signed by Olivia Hilliard PA-C

## 2021-06-20 NOTE — LETTER
Letter by Noemí Faulkner RN at      Author: Noemí Faulkner RN Service: -- Author Type: --    Filed:  Encounter Date: 5/17/2020 Status: (Other)       5/17/2020        Erasto Torres  [address]  [place] [address]    COVID-19 Antibody Screen   Date Value Ref Range Status   05/14/2020 Negative  Final     Comment:     No COVID-19 antibodies detected.  Patients within 10 days of symptom onset for  COVID-19 may not produce sufficient levels of detectable antibodies.  Immunocompromised COVID-19 patients may take longer to develop antibodies.       You have tested NEGATIVE for COVID-19 antibodies. This suggests you have not had or been exposed to COVID-19. But it does not mean that for sure.    The test finds antibodies in most people 10 days after they get sick. For some people, it takes longer than 10 days for antibodies to show up. Others may never show antibodies against COVID-19, especially if they have weak immune systems.    If you have COVID-19 symptoms now, please stay home and away from others.     Your current symptoms may or may not be COVID-19.     What is antibody testing?  This is a kind of blood test. We take a small sample of your blood, and then test it for something called antibodies.   Your body makes antibodies to fight infection. If your blood has antibodies for a certain germ, it means youve been infected with that germ in the past.   Sometimes, antibodies stay in your body for years after youve had the infection. They can be there even if the germ didnt make you sick. They are a sign that your body fought off the infection.  Will this test find antibodies in everyone whos had COVID-19?  No. The test finds antibodies in most people 10 days after they get sick. For some people, it takes longer than 10 days for antibodies to show up. Others may never show antibodies against COVID-19, especially if they have weak immune systems.  What are the signs of COVID-19?  Signs of COVID-19 can  appear from 2 to 14 days (up to 2 weeks) after youre infected. Some people have no symptoms or only mild symptoms. Others get very sick. The most common symptoms are:      Cough    Shortness of breath or trouble breathing    Or at least 2 of these symptoms:      Fever    Chills    Repeated shaking with chills    Muscle pain    Headache    Sore throat    Losing your sense of taste or smell    You may have other symptoms. Please contact your doctor or clinic for any symptoms that worry you.    Where can I get more information?     To learn the [facility] guidelines for staying home, please visit the [place] Department of Health website at [email or web address]    To learn more about COVID-19 and how to care for yourself at home, please visit the CDC website at https://www.cdc.gov/coronavirus/2019-ncov/about/steps-when-sick.html    For more options for care at [facility], please visit our website at [email or web address]    [facility] ([facility]) COVID-19 Hotline:  [phone]      You have tested NEGATIVE for COVID-19 antibodies. This suggests you have not had or been exposed to COVID-19. But it does not mean that for sure.   The test finds antibodies in most people 10 days after they get sick. For some people, it takes longer than 10 days for antibodies to show up. Others may never show antibodies against COVID-19, especially if they have weak immune systems.  If you have COVID-19 symptoms now, please stay home and away from others.   What is antibody testing?  This is a kind of blood test. We take a small sample of your blood, and then test it for something called antibodies.   Your body makes antibodies to fight infection. If your blood has antibodies for a certain germ, it means youve been infected with that germ in the past.   Sometimes, antibodies stay in your body for years after youve had the infection. They can be there even if the  germ didnt make you sick. They are a sign that your body fought off the infection.  Will this test find antibodies in everyone whos had COVID-19?  No. The test finds antibodies in most people 10 days after they get sick. For some people, it takes longer than 10 days for antibodies to show up. Others may never show antibodies against COVID-19, especially if they have weak immune systems.  What does it mean if the test finds COVID-19 antibodies?  If we find these antibodies, it suggests:     This person has had the virus.     Their bodys immune system fought the virus.   We dont know if this will help protect someone from getting COVID-19 again. Scientists are still learning about this.  What are the signs of COVID-19?  Signs of COVID-19 can appear from 2 to 14 days (up to 2 weeks) after youre infected. Some people have no symptoms or only mild symptoms. Others get very sick. The most common symptoms are:    Cough    Shortness of breath or trouble breathing      Or at least 2 of these symptoms:      Fever    Chills    Repeated shaking with chills    Muscle pain    Headache    Sore throat    Losing your sense of taste or smell    You may have other symptoms. Please contact your doctor or clinic for any symptoms that worry you.    Where can I get more information?     To learn the [facility] guidelines for staying home, please visit the [place] Department of Health website at [email or web address]/[email or web address]/[email or web address]    To learn more about COVID-19 and how to care for yourself at home, please visit the CDC website at https://www.cdc.gov/coronavirus/2019-ncov/about/steps-when-sick.html    For more options for care at [facility], please visit our website at [email or web address]    [facility] ([facility]) COVID-19 Hotline:  [phone]

## 2021-07-28 ENCOUNTER — APPOINTMENT (OUTPATIENT)
Dept: URGENT CARE | Facility: CLINIC | Age: 36
End: 2021-07-28
Payer: COMMERCIAL

## 2021-10-10 ENCOUNTER — HEALTH MAINTENANCE LETTER (OUTPATIENT)
Age: 36
End: 2021-10-10

## 2021-12-02 ENCOUNTER — OFFICE VISIT (OUTPATIENT)
Dept: FAMILY MEDICINE | Facility: CLINIC | Age: 36
End: 2021-12-02
Payer: COMMERCIAL

## 2021-12-02 VITALS
SYSTOLIC BLOOD PRESSURE: 122 MMHG | DIASTOLIC BLOOD PRESSURE: 75 MMHG | WEIGHT: 200 LBS | HEART RATE: 62 BPM | BODY MASS INDEX: 26.39 KG/M2 | TEMPERATURE: 98 F | OXYGEN SATURATION: 99 %

## 2021-12-02 DIAGNOSIS — Z48.02 ENCOUNTER FOR REMOVAL OF SUTURES: Primary | ICD-10-CM

## 2021-12-02 DIAGNOSIS — S01.511D LIP LACERATION, SUBSEQUENT ENCOUNTER: ICD-10-CM

## 2021-12-02 PROCEDURE — 99212 OFFICE O/P EST SF 10 MIN: CPT | Performed by: NURSE PRACTITIONER

## 2021-12-02 NOTE — PATIENT INSTRUCTIONS
Apply bacitracin to reduce scar formation.  Follow up with your PCP for routine preventative visit.

## 2021-12-02 NOTE — PROGRESS NOTES
"  Assessment & Plan     Encounter for removal of sutures  Lip laceration, subsequent encounter  Laceration appears well-healed, sutures removed today without issue.  Recommended bacitracin to help reduce the scar.  No further follow-up needed.            Return Follow-up with PCP for routine preventative visit..    Kandy Quinonez NP  [facility]    Madie Maurer is a 36 year old who presents for the following health issues:      \"Recheck upper lip lac w/suture placement; needs suture removal.  pt states happened 11/25 while in [place]. neighbor next door, who is a sports med provider, placed sutures for him. DID NOT go to uc/ed for issue\"    History of Present Illness       He eats 2-3 servings of fruits and vegetables daily.He consumes 0 sweetened beverage(s) daily.He exercises with enough effort to increase his heart rate 10 to 19 minutes per day.  He exercises with enough effort to increase his heart rate 4 days per week.   He is taking medications regularly.     Lip laceration during [employer] football game.       Neighbor placed sutures, is a sports med MD.      Healed well, one fell out.       Sees dentiist tomorrow, no loose teeth.          Review of Systems   Constitutional, HEENT, cardiovascular, pulmonary, gi and gu systems are negative, except as otherwise noted.      Objective    /75 (BP Location: Right arm, Patient Position: Sitting, Cuff Size: Adult Large)   Pulse 62   Temp 98  F (36.7  C) (Temporal)   Wt 90.7 kg (200 lb)   SpO2 99%   BMI 26.39 kg/m    Body mass index is 26.39 kg/m .  Physical Exam   GENERAL: healthy, alert and no distress  HENT: upper lip laceration approximated, appears to be healing well, no loose or broken teeth or oral trauma noted.  MS: no gross musculoskeletal defects noted, no edema        "

## 2022-03-12 ENCOUNTER — NURSE TRIAGE (OUTPATIENT)
Dept: NURSING | Facility: CLINIC | Age: 37
End: 2022-03-12
Payer: COMMERCIAL

## 2022-03-12 ENCOUNTER — HOSPITAL ENCOUNTER (OUTPATIENT)
Facility: HOSPITAL | Age: 37
Setting detail: OBSERVATION
Discharge: HOME | End: 2022-03-13
Attending: EMERGENCY MEDICINE | Admitting: SURGERY
Payer: COMMERCIAL

## 2022-03-12 ENCOUNTER — APPOINTMENT (OUTPATIENT)
Dept: CT IMAGING | Facility: HOSPITAL | Age: 37
End: 2022-03-12
Attending: EMERGENCY MEDICINE
Payer: COMMERCIAL

## 2022-03-12 DIAGNOSIS — K35.30 ACUTE APPENDICITIS WITH LOCALIZED PERITONITIS, WITHOUT PERFORATION, ABSCESS, OR GANGRENE: Primary | ICD-10-CM

## 2022-03-12 PROBLEM — K35.80 ACUTE APPENDICITIS: Status: ACTIVE | Noted: 2022-03-12

## 2022-03-12 LAB
ALBUMIN SERPL-MCNC: 4.6 G/DL (ref 3.5–5)
ALP SERPL-CCNC: 61 U/L (ref 45–120)
ALT SERPL W P-5'-P-CCNC: 11 U/L (ref 0–45)
ANION GAP SERPL CALCULATED.3IONS-SCNC: 13 MMOL/L (ref 5–18)
AST SERPL W P-5'-P-CCNC: 13 U/L (ref 0–40)
BASOPHILS # BLD AUTO: 0 10E3/UL (ref 0–0.2)
BASOPHILS NFR BLD AUTO: 0 %
BILIRUB SERPL-MCNC: 0.9 MG/DL (ref 0–1)
BUN SERPL-MCNC: 15 MG/DL (ref 8–22)
CALCIUM SERPL-MCNC: 9.3 MG/DL (ref 8.5–10.5)
CHLORIDE BLD-SCNC: 107 MMOL/L (ref 98–107)
CO2 SERPL-SCNC: 18 MMOL/L (ref 22–31)
CREAT SERPL-MCNC: 0.87 MG/DL (ref 0.7–1.3)
EOSINOPHIL # BLD AUTO: 0 10E3/UL (ref 0–0.7)
EOSINOPHIL NFR BLD AUTO: 0 %
ERYTHROCYTE [DISTWIDTH] IN BLOOD BY AUTOMATED COUNT: 12.7 % (ref 10–15)
GFR SERPL CREATININE-BSD FRML MDRD: >90 ML/MIN/1.73M2
GLUCOSE BLD-MCNC: 107 MG/DL (ref 70–125)
HCT VFR BLD AUTO: 43 % (ref 40–53)
HGB BLD-MCNC: 14.5 G/DL (ref 13.3–17.7)
HOLD SPECIMEN: NORMAL
IMM GRANULOCYTES # BLD: 0 10E3/UL
IMM GRANULOCYTES NFR BLD: 0 %
LACTATE SERPL-SCNC: 0.8 MMOL/L (ref 0.7–2)
LIPASE SERPL-CCNC: 19 U/L (ref 0–52)
LYMPHOCYTES # BLD AUTO: 0.6 10E3/UL (ref 0.8–5.3)
LYMPHOCYTES NFR BLD AUTO: 5 %
MCH RBC QN AUTO: 27.4 PG (ref 26.5–33)
MCHC RBC AUTO-ENTMCNC: 33.7 G/DL (ref 31.5–36.5)
MCV RBC AUTO: 81 FL (ref 78–100)
MONOCYTES # BLD AUTO: 0.8 10E3/UL (ref 0–1.3)
MONOCYTES NFR BLD AUTO: 7 %
NEUTROPHILS # BLD AUTO: 9.5 10E3/UL (ref 1.6–8.3)
NEUTROPHILS NFR BLD AUTO: 88 %
NRBC # BLD AUTO: 0 10E3/UL
NRBC BLD AUTO-RTO: 0 /100
PLATELET # BLD AUTO: 270 10E3/UL (ref 150–450)
POTASSIUM BLD-SCNC: 3.5 MMOL/L (ref 3.5–5)
PROT SERPL-MCNC: 7.4 G/DL (ref 6–8)
RBC # BLD AUTO: 5.29 10E6/UL (ref 4.4–5.9)
SODIUM SERPL-SCNC: 138 MMOL/L (ref 136–145)
WBC # BLD AUTO: 11 10E3/UL (ref 4–11)

## 2022-03-12 PROCEDURE — 99285 EMERGENCY DEPT VISIT HI MDM: CPT | Mod: 25

## 2022-03-12 PROCEDURE — 36415 COLL VENOUS BLD VENIPUNCTURE: CPT | Performed by: EMERGENCY MEDICINE

## 2022-03-12 PROCEDURE — 96375 TX/PRO/DX INJ NEW DRUG ADDON: CPT

## 2022-03-12 PROCEDURE — 96367 TX/PROPH/DG ADDL SEQ IV INF: CPT

## 2022-03-12 PROCEDURE — G0378 HOSPITAL OBSERVATION PER HR: HCPCS

## 2022-03-12 PROCEDURE — 96361 HYDRATE IV INFUSION ADD-ON: CPT

## 2022-03-12 PROCEDURE — 258N000003 HC RX IP 258 OP 636: Performed by: SURGERY

## 2022-03-12 PROCEDURE — 74177 CT ABD & PELVIS W/CONTRAST: CPT

## 2022-03-12 PROCEDURE — 85025 COMPLETE CBC W/AUTO DIFF WBC: CPT | Performed by: EMERGENCY MEDICINE

## 2022-03-12 PROCEDURE — 250N000011 HC RX IP 250 OP 636: Performed by: EMERGENCY MEDICINE

## 2022-03-12 PROCEDURE — 96365 THER/PROPH/DIAG IV INF INIT: CPT | Mod: 59

## 2022-03-12 PROCEDURE — 83690 ASSAY OF LIPASE: CPT | Performed by: EMERGENCY MEDICINE

## 2022-03-12 PROCEDURE — 87635 SARS-COV-2 COVID-19 AMP PRB: CPT | Performed by: EMERGENCY MEDICINE

## 2022-03-12 PROCEDURE — 80053 COMPREHEN METABOLIC PANEL: CPT | Performed by: EMERGENCY MEDICINE

## 2022-03-12 PROCEDURE — 83605 ASSAY OF LACTIC ACID: CPT | Performed by: EMERGENCY MEDICINE

## 2022-03-12 PROCEDURE — C9803 HOPD COVID-19 SPEC COLLECT: HCPCS

## 2022-03-12 RX ORDER — IOPAMIDOL 755 MG/ML
100 INJECTION, SOLUTION INTRAVASCULAR ONCE
Status: COMPLETED | OUTPATIENT
Start: 2022-03-12 | End: 2022-03-12

## 2022-03-12 RX ORDER — SODIUM CHLORIDE 9 MG/ML
INJECTION, SOLUTION INTRAVENOUS CONTINUOUS
Status: DISCONTINUED | OUTPATIENT
Start: 2022-03-12 | End: 2022-03-13 | Stop reason: HOSPADM

## 2022-03-12 RX ORDER — KETOROLAC TROMETHAMINE 30 MG/ML
15 INJECTION, SOLUTION INTRAMUSCULAR; INTRAVENOUS ONCE
Status: COMPLETED | OUTPATIENT
Start: 2022-03-12 | End: 2022-03-12

## 2022-03-12 RX ORDER — ACETAMINOPHEN 325 MG/1
650 TABLET ORAL EVERY 6 HOURS PRN
Status: DISCONTINUED | OUTPATIENT
Start: 2022-03-12 | End: 2022-03-13 | Stop reason: HOSPADM

## 2022-03-12 RX ORDER — PIPERACILLIN SODIUM, TAZOBACTAM SODIUM 3; .375 G/15ML; G/15ML
3.38 INJECTION, POWDER, LYOPHILIZED, FOR SOLUTION INTRAVENOUS ONCE
Status: COMPLETED | OUTPATIENT
Start: 2022-03-12 | End: 2022-03-12

## 2022-03-12 RX ORDER — ONDANSETRON 2 MG/ML
4 INJECTION INTRAMUSCULAR; INTRAVENOUS EVERY 6 HOURS PRN
Status: DISCONTINUED | OUTPATIENT
Start: 2022-03-12 | End: 2022-03-13 | Stop reason: HOSPADM

## 2022-03-12 RX ORDER — DOCUSATE SODIUM 100 MG/1
100 CAPSULE, LIQUID FILLED ORAL DAILY PRN
Status: DISCONTINUED | OUTPATIENT
Start: 2022-03-12 | End: 2022-03-13 | Stop reason: HOSPADM

## 2022-03-12 RX ORDER — SODIUM CHLORIDE, SODIUM LACTATE, POTASSIUM CHLORIDE, CALCIUM CHLORIDE 600; 310; 30; 20 MG/100ML; MG/100ML; MG/100ML; MG/100ML
INJECTION, SOLUTION INTRAVENOUS ONCE
Status: DISCONTINUED | OUTPATIENT
Start: 2022-03-12 | End: 2022-03-12

## 2022-03-12 RX ORDER — PIPERACILLIN SODIUM, TAZOBACTAM SODIUM 3; .375 G/15ML; G/15ML
3.38 INJECTION, POWDER, LYOPHILIZED, FOR SOLUTION INTRAVENOUS EVERY 8 HOURS
Status: DISCONTINUED | OUTPATIENT
Start: 2022-03-13 | End: 2022-03-13 | Stop reason: HOSPADM

## 2022-03-12 RX ORDER — HYDROCODONE BITARTRATE AND ACETAMINOPHEN 5; 325 MG/1; MG/1
1-2 TABLET ORAL EVERY 6 HOURS PRN
Status: DISCONTINUED | OUTPATIENT
Start: 2022-03-12 | End: 2022-03-13 | Stop reason: HOSPADM

## 2022-03-12 RX ORDER — MORPHINE SULFATE 2 MG/ML
2 INJECTION, SOLUTION INTRAMUSCULAR; INTRAVENOUS
Status: DISCONTINUED | OUTPATIENT
Start: 2022-03-12 | End: 2022-03-13 | Stop reason: HOSPADM

## 2022-03-12 RX ADMIN — IOPAMIDOL 100 ML: 755 INJECTION, SOLUTION INTRAVENOUS at 22:11

## 2022-03-12 RX ADMIN — SODIUM CHLORIDE: 9 INJECTION, SOLUTION INTRAVENOUS at 23:33

## 2022-03-12 RX ADMIN — PIPERACILLIN AND TAZOBACTAM 3.38 G: 3; .375 INJECTION, POWDER, LYOPHILIZED, FOR SOLUTION INTRAVENOUS at 22:52

## 2022-03-12 RX ADMIN — KETOROLAC TROMETHAMINE 15 MG: 30 INJECTION, SOLUTION INTRAMUSCULAR; INTRAVENOUS at 21:19

## 2022-03-12 ASSESSMENT — ENCOUNTER SYMPTOMS
HEMATURIA: 0
DYSURIA: 0
APPETITE CHANGE: 1
DIARRHEA: 0
VOMITING: 0
ABDOMINAL PAIN: 1
FEVER: 0
NAUSEA: 1

## 2022-03-13 ENCOUNTER — ANESTHESIA (OUTPATIENT)
Dept: SURGERY | Facility: HOSPITAL | Age: 37
End: 2022-03-13
Payer: COMMERCIAL

## 2022-03-13 ENCOUNTER — ANESTHESIA EVENT (OUTPATIENT)
Dept: SURGERY | Facility: HOSPITAL | Age: 37
End: 2022-03-13
Payer: COMMERCIAL

## 2022-03-13 VITALS
TEMPERATURE: 98.6 F | WEIGHT: 190 LBS | BODY MASS INDEX: 25.07 KG/M2 | HEART RATE: 50 BPM | SYSTOLIC BLOOD PRESSURE: 111 MMHG | RESPIRATION RATE: 16 BRPM | DIASTOLIC BLOOD PRESSURE: 64 MMHG | OXYGEN SATURATION: 99 %

## 2022-03-13 LAB
ALBUMIN UR-MCNC: 20 MG/DL
APPEARANCE UR: CLEAR
BILIRUB UR QL STRIP: NEGATIVE
COLOR UR AUTO: ABNORMAL
GLUCOSE UR STRIP-MCNC: NEGATIVE MG/DL
HGB UR QL STRIP: ABNORMAL
KETONES UR STRIP-MCNC: 10 MG/DL
LEUKOCYTE ESTERASE UR QL STRIP: NEGATIVE
NITRATE UR QL: NEGATIVE
PH UR STRIP: 6.5 [PH] (ref 5–7)
RBC URINE: 3 /HPF
SARS-COV-2 RNA RESP QL NAA+PROBE: NEGATIVE
SP GR UR STRIP: >1.05 (ref 1–1.03)
UROBILINOGEN UR STRIP-MCNC: <2 MG/DL
WBC URINE: 1 /HPF

## 2022-03-13 PROCEDURE — 258N000003 HC RX IP 258 OP 636: Performed by: NURSE ANESTHETIST, CERTIFIED REGISTERED

## 2022-03-13 PROCEDURE — 44970 LAPAROSCOPY APPENDECTOMY: CPT | Performed by: SURGERY

## 2022-03-13 PROCEDURE — 250N000011 HC RX IP 250 OP 636: Performed by: NURSE ANESTHETIST, CERTIFIED REGISTERED

## 2022-03-13 PROCEDURE — 250N000013 HC RX MED GY IP 250 OP 250 PS 637: Performed by: SURGERY

## 2022-03-13 PROCEDURE — 250N000011 HC RX IP 250 OP 636: Performed by: SURGERY

## 2022-03-13 PROCEDURE — 250N000009 HC RX 250: Performed by: NURSE ANESTHETIST, CERTIFIED REGISTERED

## 2022-03-13 PROCEDURE — 710N000009 HC RECOVERY PHASE 1, LEVEL 1, PER MIN: Performed by: SURGERY

## 2022-03-13 PROCEDURE — 999N000141 HC STATISTIC PRE-PROCEDURE NURSING ASSESSMENT: Performed by: SURGERY

## 2022-03-13 PROCEDURE — 250N000025 HC SEVOFLURANE, PER MIN: Performed by: SURGERY

## 2022-03-13 PROCEDURE — 370N000017 HC ANESTHESIA TECHNICAL FEE, PER MIN: Performed by: SURGERY

## 2022-03-13 PROCEDURE — 88304 TISSUE EXAM BY PATHOLOGIST: CPT | Mod: 26 | Performed by: PATHOLOGY

## 2022-03-13 PROCEDURE — 360N000080 HC SURGERY LEVEL 7, PER MIN: Performed by: SURGERY

## 2022-03-13 PROCEDURE — G0378 HOSPITAL OBSERVATION PER HR: HCPCS

## 2022-03-13 PROCEDURE — 81001 URINALYSIS AUTO W/SCOPE: CPT | Performed by: EMERGENCY MEDICINE

## 2022-03-13 PROCEDURE — 96366 THER/PROPH/DIAG IV INF ADDON: CPT | Mod: 59

## 2022-03-13 PROCEDURE — 250N000013 HC RX MED GY IP 250 OP 250 PS 637: Performed by: ANESTHESIOLOGY

## 2022-03-13 PROCEDURE — 99204 OFFICE O/P NEW MOD 45 MIN: CPT | Mod: 57 | Performed by: SURGERY

## 2022-03-13 PROCEDURE — 250N000011 HC RX IP 250 OP 636: Performed by: ANESTHESIOLOGY

## 2022-03-13 PROCEDURE — 272N000001 HC OR GENERAL SUPPLY STERILE: Performed by: SURGERY

## 2022-03-13 PROCEDURE — 258N000003 HC RX IP 258 OP 636: Performed by: ANESTHESIOLOGY

## 2022-03-13 PROCEDURE — 88304 TISSUE EXAM BY PATHOLOGIST: CPT | Mod: TC | Performed by: SURGERY

## 2022-03-13 PROCEDURE — 710N000012 HC RECOVERY PHASE 2, PER MINUTE: Performed by: SURGERY

## 2022-03-13 RX ORDER — DEXAMETHASONE SODIUM PHOSPHATE 10 MG/ML
INJECTION, SOLUTION INTRAMUSCULAR; INTRAVENOUS PRN
Status: DISCONTINUED | OUTPATIENT
Start: 2022-03-13 | End: 2022-03-13

## 2022-03-13 RX ORDER — MEPERIDINE HYDROCHLORIDE 25 MG/ML
12.5 INJECTION INTRAMUSCULAR; INTRAVENOUS; SUBCUTANEOUS
Status: DISCONTINUED | OUTPATIENT
Start: 2022-03-13 | End: 2022-03-13 | Stop reason: HOSPADM

## 2022-03-13 RX ORDER — BUPIVACAINE HYDROCHLORIDE 2.5 MG/ML
INJECTION, SOLUTION EPIDURAL; INFILTRATION; INTRACAUDAL PRN
Status: DISCONTINUED | OUTPATIENT
Start: 2022-03-13 | End: 2022-03-13 | Stop reason: HOSPADM

## 2022-03-13 RX ORDER — SODIUM CHLORIDE, SODIUM LACTATE, POTASSIUM CHLORIDE, CALCIUM CHLORIDE 600; 310; 30; 20 MG/100ML; MG/100ML; MG/100ML; MG/100ML
INJECTION, SOLUTION INTRAVENOUS CONTINUOUS
Status: DISCONTINUED | OUTPATIENT
Start: 2022-03-13 | End: 2022-03-13 | Stop reason: HOSPADM

## 2022-03-13 RX ORDER — ONDANSETRON 2 MG/ML
INJECTION INTRAMUSCULAR; INTRAVENOUS PRN
Status: DISCONTINUED | OUTPATIENT
Start: 2022-03-13 | End: 2022-03-13

## 2022-03-13 RX ORDER — DOCUSATE SODIUM 100 MG/1
100 CAPSULE, LIQUID FILLED ORAL 2 TIMES DAILY
Qty: 30 CAPSULE | Refills: 0 | Status: SHIPPED | OUTPATIENT
Start: 2022-03-13 | End: 2022-04-28

## 2022-03-13 RX ORDER — ACETAMINOPHEN 325 MG/1
975 TABLET ORAL ONCE
Status: COMPLETED | OUTPATIENT
Start: 2022-03-13 | End: 2022-03-13

## 2022-03-13 RX ORDER — OXYCODONE HYDROCHLORIDE 5 MG/1
5 TABLET ORAL EVERY 4 HOURS PRN
Status: DISCONTINUED | OUTPATIENT
Start: 2022-03-13 | End: 2022-03-13 | Stop reason: HOSPADM

## 2022-03-13 RX ORDER — ONDANSETRON 4 MG/1
4 TABLET, ORALLY DISINTEGRATING ORAL EVERY 30 MIN PRN
Status: DISCONTINUED | OUTPATIENT
Start: 2022-03-13 | End: 2022-03-13 | Stop reason: HOSPADM

## 2022-03-13 RX ORDER — NALOXONE HYDROCHLORIDE 0.4 MG/ML
0.4 INJECTION, SOLUTION INTRAMUSCULAR; INTRAVENOUS; SUBCUTANEOUS
Status: DISCONTINUED | OUTPATIENT
Start: 2022-03-13 | End: 2022-03-13 | Stop reason: HOSPADM

## 2022-03-13 RX ORDER — NALOXONE HYDROCHLORIDE 0.4 MG/ML
0.2 INJECTION, SOLUTION INTRAMUSCULAR; INTRAVENOUS; SUBCUTANEOUS
Status: DISCONTINUED | OUTPATIENT
Start: 2022-03-13 | End: 2022-03-13 | Stop reason: HOSPADM

## 2022-03-13 RX ORDER — OXYCODONE HYDROCHLORIDE 5 MG/1
5-10 TABLET ORAL EVERY 4 HOURS PRN
Qty: 10 TABLET | Refills: 0 | Status: SHIPPED | OUTPATIENT
Start: 2022-03-13 | End: 2022-04-28

## 2022-03-13 RX ORDER — SODIUM CHLORIDE, SODIUM LACTATE, POTASSIUM CHLORIDE, CALCIUM CHLORIDE 600; 310; 30; 20 MG/100ML; MG/100ML; MG/100ML; MG/100ML
INJECTION, SOLUTION INTRAVENOUS CONTINUOUS PRN
Status: DISCONTINUED | OUTPATIENT
Start: 2022-03-13 | End: 2022-03-13

## 2022-03-13 RX ORDER — FENTANYL CITRATE 50 UG/ML
25 INJECTION, SOLUTION INTRAMUSCULAR; INTRAVENOUS
Status: DISCONTINUED | OUTPATIENT
Start: 2022-03-13 | End: 2022-03-13 | Stop reason: HOSPADM

## 2022-03-13 RX ORDER — LIDOCAINE 40 MG/G
CREAM TOPICAL
Status: DISCONTINUED | OUTPATIENT
Start: 2022-03-13 | End: 2022-03-13 | Stop reason: HOSPADM

## 2022-03-13 RX ORDER — PROPOFOL 10 MG/ML
INJECTION, EMULSION INTRAVENOUS PRN
Status: DISCONTINUED | OUTPATIENT
Start: 2022-03-13 | End: 2022-03-13

## 2022-03-13 RX ORDER — FENTANYL CITRATE 50 UG/ML
INJECTION, SOLUTION INTRAMUSCULAR; INTRAVENOUS PRN
Status: DISCONTINUED | OUTPATIENT
Start: 2022-03-13 | End: 2022-03-13

## 2022-03-13 RX ORDER — LIDOCAINE HYDROCHLORIDE 20 MG/ML
INJECTION, SOLUTION INFILTRATION; PERINEURAL PRN
Status: DISCONTINUED | OUTPATIENT
Start: 2022-03-13 | End: 2022-03-13

## 2022-03-13 RX ORDER — ONDANSETRON 2 MG/ML
4 INJECTION INTRAMUSCULAR; INTRAVENOUS EVERY 30 MIN PRN
Status: DISCONTINUED | OUTPATIENT
Start: 2022-03-13 | End: 2022-03-13 | Stop reason: HOSPADM

## 2022-03-13 RX ORDER — HYDROCODONE BITARTRATE AND ACETAMINOPHEN 5; 325 MG/1; MG/1
1 TABLET ORAL
Status: DISCONTINUED | OUTPATIENT
Start: 2022-03-13 | End: 2022-03-13 | Stop reason: HOSPADM

## 2022-03-13 RX ORDER — KETOROLAC TROMETHAMINE 30 MG/ML
INJECTION, SOLUTION INTRAMUSCULAR; INTRAVENOUS PRN
Status: DISCONTINUED | OUTPATIENT
Start: 2022-03-13 | End: 2022-03-13

## 2022-03-13 RX ORDER — FENTANYL CITRATE 50 UG/ML
25 INJECTION, SOLUTION INTRAMUSCULAR; INTRAVENOUS EVERY 5 MIN PRN
Status: DISCONTINUED | OUTPATIENT
Start: 2022-03-13 | End: 2022-03-13 | Stop reason: HOSPADM

## 2022-03-13 RX ORDER — HYDROMORPHONE HYDROCHLORIDE 1 MG/ML
0.2 INJECTION, SOLUTION INTRAMUSCULAR; INTRAVENOUS; SUBCUTANEOUS EVERY 5 MIN PRN
Status: DISCONTINUED | OUTPATIENT
Start: 2022-03-13 | End: 2022-03-13 | Stop reason: HOSPADM

## 2022-03-13 RX ADMIN — FENTANYL CITRATE 50 MCG: 50 INJECTION, SOLUTION INTRAMUSCULAR; INTRAVENOUS at 15:53

## 2022-03-13 RX ADMIN — SUGAMMADEX 200 MG: 100 INJECTION, SOLUTION INTRAVENOUS at 16:10

## 2022-03-13 RX ADMIN — PIPERACILLIN AND TAZOBACTAM 3.38 G: 3; .375 INJECTION, POWDER, LYOPHILIZED, FOR SOLUTION INTRAVENOUS at 04:31

## 2022-03-13 RX ADMIN — FENTANYL CITRATE 50 MCG: 50 INJECTION, SOLUTION INTRAMUSCULAR; INTRAVENOUS at 15:46

## 2022-03-13 RX ADMIN — SODIUM CHLORIDE, POTASSIUM CHLORIDE, SODIUM LACTATE AND CALCIUM CHLORIDE: 600; 310; 30; 20 INJECTION, SOLUTION INTRAVENOUS at 15:35

## 2022-03-13 RX ADMIN — FENTANYL CITRATE 25 MCG: 50 INJECTION INTRAMUSCULAR; INTRAVENOUS at 17:06

## 2022-03-13 RX ADMIN — ROCURONIUM BROMIDE 20 MG: 50 INJECTION, SOLUTION INTRAVENOUS at 15:50

## 2022-03-13 RX ADMIN — PROPOFOL 200 MG: 10 INJECTION, EMULSION INTRAVENOUS at 15:46

## 2022-03-13 RX ADMIN — SODIUM CHLORIDE, POTASSIUM CHLORIDE, SODIUM LACTATE AND CALCIUM CHLORIDE: 600; 310; 30; 20 INJECTION, SOLUTION INTRAVENOUS at 15:40

## 2022-03-13 RX ADMIN — ACETAMINOPHEN 975 MG: 325 TABLET ORAL at 15:33

## 2022-03-13 RX ADMIN — MIDAZOLAM 2 MG: 1 INJECTION INTRAMUSCULAR; INTRAVENOUS at 15:38

## 2022-03-13 RX ADMIN — LIDOCAINE HYDROCHLORIDE 60 MG: 20 INJECTION, SOLUTION INFILTRATION; PERINEURAL at 15:46

## 2022-03-13 RX ADMIN — DEXAMETHASONE SODIUM PHOSPHATE 10 MG: 10 INJECTION, SOLUTION INTRAMUSCULAR; INTRAVENOUS at 15:46

## 2022-03-13 RX ADMIN — ROCURONIUM BROMIDE 20 MG: 50 INJECTION, SOLUTION INTRAVENOUS at 15:55

## 2022-03-13 RX ADMIN — MORPHINE SULFATE 2 MG: 2 INJECTION, SOLUTION INTRAMUSCULAR; INTRAVENOUS at 13:56

## 2022-03-13 RX ADMIN — KETOROLAC TROMETHAMINE 30 MG: 30 INJECTION, SOLUTION INTRAMUSCULAR at 16:10

## 2022-03-13 RX ADMIN — ONDANSETRON 4 MG: 2 INJECTION INTRAMUSCULAR; INTRAVENOUS at 18:21

## 2022-03-13 RX ADMIN — FENTANYL CITRATE 25 MCG: 50 INJECTION INTRAMUSCULAR; INTRAVENOUS at 16:54

## 2022-03-13 RX ADMIN — HYDROCODONE BITARTRATE AND ACETAMINOPHEN 2 TABLET: 5; 325 TABLET ORAL at 00:51

## 2022-03-13 RX ADMIN — ONDANSETRON 4 MG: 2 INJECTION INTRAMUSCULAR; INTRAVENOUS at 15:51

## 2022-03-13 RX ADMIN — HYDROCODONE BITARTRATE AND ACETAMINOPHEN 2 TABLET: 5; 325 TABLET ORAL at 07:30

## 2022-03-13 RX ADMIN — Medication 100 MG: at 15:46

## 2022-03-13 RX ADMIN — PIPERACILLIN AND TAZOBACTAM 3.38 G: 3; .375 INJECTION, POWDER, LYOPHILIZED, FOR SOLUTION INTRAVENOUS at 12:20

## 2022-03-13 RX ADMIN — FENTANYL CITRATE 25 MCG: 50 INJECTION INTRAMUSCULAR; INTRAVENOUS at 16:45

## 2022-03-13 NOTE — PLAN OF CARE
Goal Outcome Evaluation:     PRIMARY DIAGNOSIS: ACUTE PAIN  OUTPATIENT/OBSERVATION GOALS TO BE MET BEFORE DISCHARGE:  1. Pain Status: Improved-controlled with oral pain medications.    2. Return to near baseline physical activity: No    3. Cleared for discharge by consultants (if involved): No    Discharge Planner Nurse   Safe discharge environment identified: No  Barriers to discharge: Yes       Entered by: Maral Smith 03/13/2022 8:01 AM     Please review provider order for any additional goals.   Nurse to notify provider when observation goals have been met and patient is ready for discharge.  Surgery plan for today.

## 2022-03-13 NOTE — ED PROVIDER NOTES
EMERGENCY DEPARTMENT ENCOUNTER      NAME: Erasto Torres  AGE: 36 year old male  YOB: 1985  MRN: [id number]  EVALUATION DATE & TIME: 3/12/2022  9:03 PM    PCP: Shane Gonzalez    ED PROVIDER: Luana Perez M.D.      Chief Complaint   Patient presents with     Abdominal Pain         FINAL IMPRESSION:  1. Acute appendicitis with localized peritonitis, without perforation, abscess, or gangrene        MEDICAL DECISION MAKIN:12 PM I met with the patient, obtained history, performed an initial exam, and discussed options and plan for diagnostics and treatment here in the ED.   11:02 PM Rechecked and updated patient on his results.    11:13 PM Spoke with Dr. Barriga, General Surgery, about patient.     Pertinent Labs & Imaging studies reviewed. (See chart for details)     Erasto Torres is a 36 year old male who presents with abdominal pain.  He does not appear in acute distress.  He has normal vital signs without a fever.  No significant medical history.  Exam does show tenderness in the right lower quadrant and I do suspect early appendicitis.  I will get basic labs, give him medications for pain and send him for a CT scan.    Labs show a slightly elevated white blood cell count with a left shift.  CT scan confirms early acute appendicitis.  I discussed with Nicholas Kieth who will take the patient to the operating room in the morning.  He will be given Zosyn.  N.p.o. and IV fluids started.    I updated the patient and his wife on the results and plan of care.  They are in agreement.       MEDICATIONS GIVEN IN THE EMERGENCY:  Medications   sodium chloride 0.9% infusion (has no administration in time range)   HYDROcodone-acetaminophen (NORCO) 5-325 MG per tablet 1-2 tablet (has no administration in time range)   acetaminophen (TYLENOL) tablet 650 mg (has no administration in time range)   morphine (PF) injection 2 mg (has no administration in time range)   piperacillin-tazobactam (ZOSYN) 3.375 g vial to  attach to  mL bag (has no administration in time range)   ondansetron (ZOFRAN) injection 4 mg (has no administration in time range)   docusate sodium (COLACE) capsule 100 mg (has no administration in time range)   ketorolac (TORADOL) injection 15 mg (15 mg Intravenous Given 3/12/22 2119)   iopamidol (ISOVUE-370) solution 100 mL (100 mLs Intravenous Given 3/12/22 2211)   piperacillin-tazobactam (ZOSYN) 3.375 g vial to attach to  mL bag (3.375 g Intravenous New Bag 3/12/22 2252)       =================================================================    HPI    Patient information was obtained from: Patient     Use of : Use of :      Erasto Torres is a 36 year old male with a history of mononucleosis who presents with abdominal pain. Patient presents with abdominal pain onset 1200 that became severe at ~1500. Pain is described to be sharp and shooting, intermittently radiating above his umbilicus and RLQ. Currently, pain has improved but he notes that RLQ pain worsens with straightening of his legs. He endorses mild nausea with walking, none while at rest. Denies vomiting, diarrhea, fever, dysuria, or hematuria. He additionally endorses decreased appetite, last eating at 1300 this afternoon. Patient last drank water at ~1600. Denies history of kidney stones or abdominal surgeries. He reports having had an adverse reaction to an antibiotic from a dental procedure, but is unable to recall the name of it. Patient is fully vaccinated for COVID-19 (including booster). Patient denies any additional complaints at this time.     REVIEW OF SYSTEMS   Review of Systems   Constitutional: Positive for appetite change (decreased). Negative for fever.   Gastrointestinal: Positive for abdominal pain and nausea. Negative for diarrhea and vomiting.   Genitourinary: Negative for dysuria and hematuria.   All other systems reviewed and are negative.       PAST MEDICAL HISTORY:  Past Medical History:    Diagnosis Date     Mononucleosis syndrome        PAST SURGICAL HISTORY:  Past Surgical History:   Procedure Laterality Date     ANKLE SURGERY       ORIF FINGER / THUMB FRACTURE Right        CURRENT MEDICATIONS:      Current Facility-Administered Medications:      acetaminophen (TYLENOL) tablet 650 mg, 650 mg, Oral, Q6H PRN, Alissa Barriga DO     docusate sodium (COLACE) capsule 100 mg, 100 mg, Oral, Daily PRN, Alissa Barriga DO     HYDROcodone-acetaminophen (NORCO) 5-325 MG per tablet 1-2 tablet, 1-2 tablet, Oral, Q6H PRN, Alissa Barriga DO     morphine (PF) injection 2 mg, 2 mg, Intravenous, Q3H PRN, Alissa Barriga DO     ondansetron (ZOFRAN) injection 4 mg, 4 mg, Intravenous, Q6H PRN, Alissa Barriga DO     piperacillin-tazobactam (ZOSYN) 3.375 g vial to attach to  mL bag, 3.375 g, Intravenous, Q8H, Alissa Barriga DO     sodium chloride 0.9% infusion, , Intravenous, Continuous, Alissa Barriga DO  No current outpatient medications on file.    ALLERGIES:  Allergies   Allergen Reactions     No Known Allergies      After dental procedure had an unknown antibiotic that caused severe stomach pains       FAMILY HISTORY:  Family History   Problem Relation Age of Onset     No Known Problems Daughter      Hypertension Mother      Hyperlipidemia Mother      Diabetes Maternal Grandmother        SOCIAL HISTORY:   Social History     Tobacco Use     Smoking status: Never Smoker     Smokeless tobacco: Never Used     Tobacco comment: no second hand smoke exposure   Substance Use Topics     Alcohol use: No     Drug use: None        PHYSICAL EXAM:    Vitals: /58   Pulse 62   Temp 98.1  F (36.7  C) (Oral)   Resp 20   Wt 86.2 kg (190 lb)   SpO2 99%   BMI 25.07 kg/m     General:. Alert and interactive, comfortable appearing.  HENT: Oropharynx without erythema or exudates. MMM.  TMs clear bilaterally.  Eyes: Pupils mid-sized and equally reactive.   Neck: Full AROM.  No midline tenderness to  palpation.  Cardiovascular: Regular rate and rhythm. Peripheral pulses 2+ bilaterally.  Chest/Pulmonary: Normal work of breathing. Lung sounds clear and equal throughout, no wheezes or crackles. No chest wall tenderness or deformities.  Abdomen: Soft, nondistended. RLQ tenderness without guarding or rebound. Decreased bowel sounds.   Back/Spine: No CVA or midline tenderness.  Extremities: Normal ROM of all major joints. No lower extremity edema.   Skin: Warm and dry. Normal skin color.   Neuro: Speech clear. CNs grossly intact. Moves all extremities appropriately. Strength and sensation grossly intact to all extremities.   Psych: Normal affect/mood, cooperative, memory appropriate.     LAB:  All pertinent labs reviewed and interpreted.  Labs Ordered and Resulted from Time of ED Arrival to Time of ED Departure   CBC WITH PLATELETS AND DIFFERENTIAL - Abnormal       Result Value    WBC Count 11.0      RBC Count 5.29      Hemoglobin 14.5      Hematocrit 43.0      MCV 81      MCH 27.4      MCHC 33.7      RDW 12.7      Platelet Count 270      % Neutrophils 88      % Lymphocytes 5      % Monocytes 7      % Eosinophils 0      % Basophils 0      % Immature Granulocytes 0      NRBCs per 100 WBC 0      Absolute Neutrophils 9.5 (*)     Absolute Lymphocytes 0.6 (*)     Absolute Monocytes 0.8      Absolute Eosinophils 0.0      Absolute Basophils 0.0      Absolute Immature Granulocytes 0.0      Absolute NRBCs 0.0     COMPREHENSIVE METABOLIC PANEL - Abnormal    Sodium 138      Potassium 3.5      Chloride 107      Carbon Dioxide (CO2) 18 (*)     Anion Gap 13      Urea Nitrogen 15      Creatinine 0.87      Calcium 9.3      Glucose 107      Alkaline Phosphatase 61      AST 13      ALT 11      Protein Total 7.4      Albumin 4.6      Bilirubin Total 0.9      GFR Estimate >90     LIPASE - Normal    Lipase 19     LACTIC ACID WHOLE BLOOD - Normal    Lactic Acid 0.8     ROUTINE UA WITH MICROSCOPIC REFLEX TO CULTURE   COVID-19 VIRUS  (CORONAVIRUS) BY PCR       RADIOLOGY:  CT Abdomen Pelvis w Contrast   Final Result   IMPRESSION:    1.  Acute appendicitis. No evidence for complications.            I, Tutu Beck, am serving as a scribe to document services personally performed by Dr. Luana Perez  based on my observation and the provider's statements to me. I, Luana Perez MD attest that Tutu Beck is acting in a scribe capacity, has observed my performance of the services and has documented them in accordance with my direction.      Luana Perez M.D.  Emergency Medicine  [facility] EMERGENCY DEPARTMENT  [address] [place] [id number]  [phone]  Dept: [phone]         Luana Perez MD  03/12/22 9376

## 2022-03-13 NOTE — ANESTHESIA POSTPROCEDURE EVALUATION
Patient: Erasto Torres    Procedure: Procedure(s):  APPENDECTOMY, LAPAROSCOPIC       Anesthesia Type:  General    Note:  Disposition: Outpatient   Postop Pain Control: Uneventful            Sign Out: Well controlled pain   PONV: No   Neuro/Psych: Uneventful            Sign Out: Acceptable/Baseline neuro status   Airway/Respiratory: Uneventful            Sign Out: Acceptable/Baseline resp. status   CV/Hemodynamics: Uneventful            Sign Out: Acceptable CV status; No obvious hypovolemia; No obvious fluid overload   Other NRE: NONE   DID A NON-ROUTINE EVENT OCCUR? No           Last vitals:  Vitals Value Taken Time   /70 03/13/22 1700   Temp 36.4  C (97.6  F) 03/13/22 1630   Pulse 65 03/13/22 1707   Resp 15 03/13/22 1707   SpO2 99 % 03/13/22 1707   Vitals shown include unvalidated device data.    Electronically Signed By: Sharda Olivarez MD  March 13, 2022  5:22 PM   complains of pain/discomfort

## 2022-03-13 NOTE — OP NOTE
Name:  Erasto Torres  PCP:  Shane Gonzalez  Procedure Date:  3/12/2022 - 3/13/2022      Procedure(s):  APPENDECTOMY, LAPAROSCOPIC    Pre-Procedure Diagnosis:  Acute appendicitis with localized peritonitis, without perforation, abscess, or gangrene [K35.30]     Post-Procedure Diagnosis:    Acute suppurative appendicitis    Surgeon(s):  Goldy Littlejohn DO    Circulator: Yousuf Timmons RN  Scrub Person: RaoulneetuMarlene girard    Anesthesia Type:  GET      Findings:  Acute suppurative appendicitis with turbid ascites    Operative Report:    The patient was taken to the operating room and placed in a supine position.  SCDs were placed on bilateral lower extremities.  Antibiotics were given prior to skin incision.  The abdomen was prepped and draped in a sterile fashion.    I began the first portion of the procedure by injecting quarter percent Marcaine with epinephrine into the infraumbilical position.  I then made a 5 mm transverse incision below the umbilicus and established a pneumoperitoneum using a Veress needle technique.  Once the pneumoperitoneum was established,I placed a 5 mm trocar with a 5 mm 30  camera into the abdomen.  The surrounding structures were scrutinized for any injuries upon entry.  I did not find any. I placed an 12 mm trocar in the left lower quadrant position as well as 1 suprapubic 5 mm trochars under direct visualization.  All trochars were placed after local anesthetic was injected to the fascial layers.    I first encountered some turbid ascites in the abdomen.  This was removed with suction catheterization.  I then manipulated the large and small bowel to gain access to the appendix.  Once found, I made a window at the base of the appendix and then fired a 45 mm blue load stapler across the base the mesoappendix.  I then fired an additional 45 mm white load stapler across the mesoappendix.  I bolster this with additional 10 mm clips to ensure hemostasis at the staple line.  I then placed the  appendix in an 10 mm Endo Catch bag and brought out the left lower quadrant incision.  Next I closed the 12 mm fascial defect with an 0 Vicryl suture using a Devin English needle technique.  I removed all trochars and deflated the abdomen.  I then closed all skin edges with 4-0 Monocryl subcuticular sutures.  The wounds were then cleaned and covered with a dry, sterile dressing.  All sponge counts and needle counts were correct at the end of the procedure.    Estimated Blood Loss:   5cc    Specimens:    ID Type Source Tests Collected by Time Destination   1 :  Tissue Appendix SURGICAL PATHOLOGY EXAM Goldy Littlejohn DO 3/13/2022  4:08 PM           Drains:        Complications:    None    Goldy Littlejohn DO

## 2022-03-13 NOTE — ED NOTES
[facility] ED Handoff Report    ED Chief Complaint: Abdominal pain    ED Diagnosis:  (K35.30) Acute appendicitis with localized peritonitis, without perforation, abscess, or gangrene  Comment:   Plan:        PMH:    Past Medical History:   Diagnosis Date     Mononucleosis syndrome         Code Status:  No Order     Falls Risk: No Band: Not applicable    Current Living Situation/Residence: lives with a significant other     Elimination Status: Continent: Yes     Activity Level: SBA    Patients Preferred Language:  English     Needed: No    Vital Signs:  /58   Pulse 62   Temp 98.1  F (36.7  C) (Oral)   Resp 20   Wt 86.2 kg (190 lb)   SpO2 99%   BMI 25.07 kg/m       Cardiac Rhythm:     Pain Score: 4/10    Is the Patient Confused:  No    Last Food or Drink: 03/12/22 at 1230    Focused Assessment:  Pain management, antibiotics and labs.   Patient came with RLQ pain started 1200 noon.     Tests Performed: Done: Labs and Imaging    Treatments Provided:  Patient started on Zosyn and fluids    Family Dynamics/Concerns: No    Family Updated On Visitor Policy: Yes    Plan of Care Communicated to Family: Yes    Who Was Updated about Plan of Care: Wife    Belongings Checklist Done and Signed by Patient: Yes    Medications sent with patient: no    Covid: asymptomatic , pending result    Additional Information:     RN: Cielo Gutierres   3/12/2022 11:47 PM

## 2022-03-13 NOTE — ANESTHESIA PREPROCEDURE EVALUATION
Anesthesia Pre-Procedure Evaluation    Patient: Erasto Torres   MRN: [id number] : 1985        Procedure : Procedure(s):  APPENDECTOMY, LAPAROSCOPIC          Past Medical History:   Diagnosis Date     Mononucleosis syndrome       Past Surgical History:   Procedure Laterality Date     ANKLE SURGERY       ORIF FINGER / THUMB FRACTURE Right      ORTHOPEDIC SURGERY        Allergies   Allergen Reactions     No Known Allergies      After dental procedure had an unknown antibiotic that caused severe stomach pains      Social History     Tobacco Use     Smoking status: Never Smoker     Smokeless tobacco: Never Used     Tobacco comment: no second hand smoke exposure   Substance Use Topics     Alcohol use: No      Wt Readings from Last 1 Encounters:   22 86.2 kg (190 lb)        Anesthesia Evaluation            ROS/MED HX  ENT/Pulmonary:  - neg pulmonary ROS     Neurologic:  - neg neurologic ROS     Cardiovascular:  - neg cardiovascular ROS     METS/Exercise Tolerance:     Hematologic:  - neg hematologic  ROS     Musculoskeletal:  - neg musculoskeletal ROS     GI/Hepatic:     (+) appendicitis,     Renal/Genitourinary:  - neg Renal ROS     Endo:  - neg endo ROS     Psychiatric/Substance Use:  - neg psychiatric ROS     Infectious Disease:  - neg infectious disease ROS     Malignancy:       Other:            Physical Exam    Airway  airway exam normal      Mallampati: I   TM distance: > 3 FB   Neck ROM: full     Respiratory Devices and Support         Dental  no notable dental history         Cardiovascular   cardiovascular exam normal          Pulmonary   pulmonary exam normal                OUTSIDE LABS:  CBC:   Lab Results   Component Value Date    WBC 11.0 2022    WBC 4.3 10/08/2019    HGB 14.5 2022    HGB 14.0 10/08/2019    HCT 43.0 2022    HCT 41.1 10/08/2019     2022     10/08/2019     BMP:   Lab Results   Component Value Date     2022    POTASSIUM 3.5  03/12/2022    CHLORIDE 107 03/12/2022    CO2 18 (L) 03/12/2022    BUN 15 03/12/2022    CR 0.87 03/12/2022     03/12/2022     COAGS: No results found for: PTT, INR, FIBR  POC: No results found for: BGM, HCG, HCGS  HEPATIC:   Lab Results   Component Value Date    ALBUMIN 4.6 03/12/2022    PROTTOTAL 7.4 03/12/2022    ALT 11 03/12/2022    AST 13 03/12/2022    ALKPHOS 61 03/12/2022    BILITOTAL 0.9 03/12/2022     OTHER:   Lab Results   Component Value Date    LACT 0.8 03/12/2022    MITRA 9.3 03/12/2022    LIPASE 19 03/12/2022       Anesthesia Plan    ASA Status:  1, emergent    NPO Status:  NPO Appropriate    Anesthesia Type: General.     - Airway: ETT   Induction: Intravenous, RSI.           Consents    Anesthesia Plan(s) and associated risks, benefits, and realistic alternatives discussed. Questions answered and patient/representative(s) expressed understanding.    - Discussed:     - Discussed with:  Patient         Postoperative Care    Pain management: Multi-modal analgesia.   PONV prophylaxis: Ondansetron (or other 5HT-3), Dexamethasone or Solumedrol     Comments:                Sharda Olivarez MD

## 2022-03-13 NOTE — PLAN OF CARE
Problem: Plan of Care - These are the overarching goals to be used throughout the patient stay.    Goal: Optimal Comfort and Wellbeing  Outcome: Ongoing, Progressing    Pt admitted last night at 0100.   Moderate pain 5/10 in RLQ abdomen. Norco given.   Kiana running.   Pt is NPO for an appendectomy scheduled for sometime this morning.   A/O, makes needs known.

## 2022-03-13 NOTE — PHARMACY-ADMISSION MEDICATION HISTORY
Pharmacy Note - Admission Medication History    Pertinent Provider Information: Does not take a RX or OTC med regularly     Allergies were reviewed, assessed, and updated with the patient.      The information provided in this note is only as accurate as the sources available at the time of the update(s).    Thank you for the opportunity to participate in the care of this patient.    Mary Jo Abel RP  3/12/2022 10:44 PM

## 2022-03-13 NOTE — ED TRIAGE NOTES
Today at noon, pt developed abdominal pain. The pain became severe this afternoon around 1500. He states the pain was above his umbilicus and is now in the RLQ. Pt describes the pain as a pressure with intermittent sharpness. Pt's abdomen is tender to touch.

## 2022-03-13 NOTE — PLAN OF CARE
Goal Outcome Evaluation:  Patient escorted to OR in a wheelchair with wife for appendectomy.

## 2022-03-13 NOTE — ANESTHESIA PROCEDURE NOTES
Airway       Patient location during procedure: OR       Procedure Start/Stop Times: 3/13/2022 3:48 PM  Staff -        CRNA: Dia Ruffin APRN CRNA       Performed By: CRNA  Consent for Airway        Urgency: elective  Indications and Patient Condition       Indications for airway management: abdulkadir-procedural       Induction type:RSI       Mask difficulty assessment: 0 - not attempted    Final Airway Details       Final airway type: endotracheal airway       Successful airway: ETT - single  Endotracheal Airway Details        ETT size (mm): 7.5       Successful intubation technique: direct laryngoscopy       DL Blade Type: MAC 4       Adjucts: stylet       Position: Right       Measured from: gums/teeth       Secured at (cm): 24       Bite block used: None    Post intubation assessment        Placement verified by: capnometry, equal breath sounds and chest rise        Number of attempts at approach: 1       Secured with: silk tape       Ease of procedure: easy       Dentition: Intact and Unchanged

## 2022-03-13 NOTE — PLAN OF CARE
PRIMARY DIAGNOSIS: ACUTE PAIN  Goal Outcome Evaluation:    OUTPATIENT/OBSERVATION GOALS TO BE MET BEFORE DISCHARGE:  1. Pain Status: Improved-controlled with oral pain medications.    2. Return to near baseline physical activity: No    3. Cleared for discharge by consultants (if involved): No    Discharge Planner Nurse   Safe discharge environment identified: Yes  Barriers to discharge: Yes       Entered by: Maral Smith 03/13/2022 12:30 PM     Please review provider order for any additional goals.   Nurse to notify provider when observation goals have been met and patient is ready for discharge.

## 2022-03-13 NOTE — ANESTHESIA CARE TRANSFER NOTE
Patient: Erasto Torres    Procedure: Procedure(s):  APPENDECTOMY, LAPAROSCOPIC       Diagnosis: Acute appendicitis with localized peritonitis, without perforation, abscess, or gangrene [K35.30]  Diagnosis Additional Information: No value filed.    Anesthesia Type:   General     Note:    Oropharynx: spontaneously breathing  Level of Consciousness: drowsy  Oxygen Supplementation: face mask  Level of Supplemental Oxygen (L/min / FiO2): 6  Independent Airway: airway patency satisfactory and stable  Dentition: dentition unchanged  Vital Signs Stable: post-procedure vital signs reviewed and stable  Report to RN Given: handoff report given  Patient transferred to: PACU  Comments: Pt. Sarita and breathing spontaneously.  Vitals stable.  Report given to oncoming nurse.  Transfer of care occurred.   Handoff Report: Identifed the Patient, Identified the Reponsible Provider, Reviewed the pertinent medical history, Discussed the surgical course, Reviewed Intra-OP anesthesia mangement and issues during anesthesia, Set expectations for post-procedure period and Allowed opportunity for questions and acknowledgement of understanding      Vitals:  Vitals Value Taken Time   BP     Temp     Pulse     Resp     SpO2         Electronically Signed By: ANA MARIA Elder CRNA  March 13, 2022  4:27 PM

## 2022-03-13 NOTE — H&P
HPI  36 year old year old male who presents with 24 hours of abdominal pain.  The pain started in periumbilical region eventually migrated to the right lower quadrant.  Denies any fevers, chills, nausea or vomiting.  No recent travel or sick contacts.      Allergies:No known allergies    Past Medical History:   Diagnosis Date     Mononucleosis syndrome        Past Surgical History:   Procedure Laterality Date     ANKLE SURGERY       ORIF FINGER / THUMB FRACTURE Right          CURRENT MEDS:    Current Facility-Administered Medications:      acetaminophen (TYLENOL) tablet 650 mg, 650 mg, Oral, Q6H PRN, Alissa Barriga DO     docusate sodium (COLACE) capsule 100 mg, 100 mg, Oral, Daily PRN, Alissa Barriga DO     HYDROcodone-acetaminophen (NORCO) 5-325 MG per tablet 1-2 tablet, 1-2 tablet, Oral, Q6H PRN, Alissa Barriga DO, 2 tablet at 03/13/22 0730     morphine (PF) injection 2 mg, 2 mg, Intravenous, Q3H PRN, Alissa Barriga DO     ondansetron (ZOFRAN) injection 4 mg, 4 mg, Intravenous, Q6H PRN, Alissa Barriga DO     piperacillin-tazobactam (ZOSYN) 3.375 g vial to attach to  mL bag, 3.375 g, Intravenous, Q8H, Alissa Barriga DO, 3.375 g at 03/13/22 0431     sodium chloride 0.9% infusion, , Intravenous, Continuous, Alissa Barriga DO, Last Rate: 50 mL/hr at 03/13/22 0739, Rate Verify at 03/13/22 0739    FAMHX-reviewed and noncontributory     reports that he has never smoked. He has never used smokeless tobacco. He reports that he does not drink alcohol.    Review of Systems:  The 12 point review of systems  is within normal limits except for as mentioned above in the HPI.  General ROS: No complaints or constitutional symptoms  Ophthalmic ROS: No complaints of visual changes  Skin: No complaints or symptoms   Endocrine: No complaints or symptoms  Hematologic/Lymphatic: No symptoms or complaints  Psychiatric: No symptoms or complaints  Respiratory ROS: no cough, shortness of breath, or wheezing  Cardiovascular  ROS: no chest pain or dyspnea on exertion  Gastrointestinal ROS: As per HPI  Genito-Urinary ROS: no dysuria, trouble voiding, or hematuria  Musculoskeletal ROS: no joint or muscle pain  Neurological ROS: no TIA or stroke symptoms      EXAM:  /60 (BP Location: Right arm)   Pulse 69   Temp 97.7  F (36.5  C) (Oral)   Resp 18   Wt 86.2 kg (190 lb)   SpO2 98%   BMI 25.07 kg/m    GENERAL: Well developed male, No acute distress, pleasant and conversant   EYES: Pupils equal, round and reactive, no scleral icterus  CARDIAC: Regular rate and rhythm, no obvious murmurs noted  CHEST/LUNG: Clear to ascultation bilaterally, No ronchi, No wheezes  ABDOMEN: Soft, tender to palpation right lower quadrant with no Rovsing sign  SKIN: Pink, warm and dry, no obvious rashes or lesions   NEURO:No focal deficits, ambulatory  MUSCULOSKELETAL:No obvious deformities, no swelling, normal appearing      LABS:  Lab Results   Component Value Date    WBC 11.0 03/12/2022    HGB 14.5 03/12/2022    HCT 43.0 03/12/2022    MCV 81 03/12/2022     03/12/2022     INR/Prothrombin Time  Recent Labs   Lab 03/12/22  2113      CO2 18*   BUN 15     Lab Results   Component Value Date    ALT 11 03/12/2022    AST 13 03/12/2022    ALKPHOS 61 03/12/2022       IMAGES:   Relevant images were reviewed and discussed with the patient.  Notable findings were: CT scan images reviewed demonstrate acute appendicitis    Assessment/Plan:   Erasto Torres is a 36 year old male with acute appendicitis.  The pathophysiology of appendicitis was explained to the patient.  Recent benefits of surgery versus nonoperative management strategies were discussed.  Patient understands everything that was discussed and has consented to proceed with laparoscopic appendectomy.  The plan will be for surgery and discharge from the recovery room today.      Tom Littlejohn D.O. FACS  [phone]

## 2022-03-13 NOTE — TELEPHONE ENCOUNTER
Negar (wife) calls and says that her  has abdominal pain. Erasto says that he has slight upper abdominal pain. Pain = 7-8/10- with sitting. Pain = 10/10-with movement. Denies any vomiting or diarrhea. Pt. Says that if he goes to an ER, he will go to  ER. Pt. Says that he is not sure if he will go. COVID 19 Nurse Triage Plan/Patient Instructions    Please be aware that novel coronavirus (COVID-19) may be circulating in the community. If you develop symptoms such as fever, cough, or SOB or if you have concerns about the presence of another infection including coronavirus (COVID-19), please contact your health care provider or visit [email or web address].     Disposition/Instructions    ED Visit recommended. Follow protocol based instructions.     Bring Your Own Device:  Please also bring your smart device(s) (smart phones, tablets, laptops) and their charging cables for your personal use and to communicate with your care team during your visit.    Thank you for taking steps to prevent the spread of this virus.  o Limit your contact with others.  o Wear a simple mask to cover your cough.  o Wash your hands well and often.    Resources    [facility]: About COVID-19: [email or web address]    CDC: What to Do If You're Sick: www.cdc.gov/coronavirus/2019-ncov/about/steps-when-sick.html    CDC: Ending Home Isolation: www.cdc.gov/coronavirus/2019-ncov/hcp/disposition-in-home-patients.html     CDC: Caring for Someone: www.cdc.gov/coronavirus/2019-ncov/if-you-are-sick/care-for-someone.html     [facility]: Interim Guidance for Hospital Discharge to Home: www.health.[facility].[place]/diseases/coronavirus/hcp/hospdischarge.pdf    [facility] clinical trials (COVID-19 research studies): [email or web address].[email or web address]/umn-clinical-trials     Below are the COVID-19 hotlines at the [place] Department of Health ([facility]). Interpreters are available.   o For health questions: Call 692-905-9363  or 1-377.324.9941 (7 a.m. to 7 p.m.)  o For questions about schools and childcare: Call 136-719-5536 or 1-656.967.7952 (7 a.m. to 7 p.m.)                   Reason for Disposition    [1] SEVERE pain (e.g., excruciating) AND [2] present > 1 hour    Additional Information    Negative: Severe difficulty breathing (e.g., struggling for each breath, speaks in single words)    Negative: Shock suspected (e.g., cold/pale/clammy skin, too weak to stand, low BP, rapid pulse)    Negative: Difficult to awaken or acting confused (e.g., disoriented, slurred speech)    Negative: Passed out (i.e., lost consciousness, collapsed and was not responding)    Negative: Visible sweat on face or sweat dripping down face    Negative: Sounds like a life-threatening emergency to the triager    Negative: Followed an abdomen (stomach) injury    Negative: Chest pain    Protocols used: ABDOMINAL PAIN - UPPER-A-AH

## 2022-03-14 DIAGNOSIS — Z98.890 POSTOPERATIVE STATE: Primary | ICD-10-CM

## 2022-03-14 RX ORDER — CIPROFLOXACIN 500 MG/1
500 TABLET, FILM COATED ORAL 2 TIMES DAILY
Qty: 14 TABLET | Refills: 0 | Status: SHIPPED | OUTPATIENT
Start: 2022-03-14 | End: 2022-04-28

## 2022-03-14 RX ORDER — METRONIDAZOLE 500 MG/1
500 TABLET ORAL 3 TIMES DAILY
Qty: 21 TABLET | Refills: 0 | Status: SHIPPED | OUTPATIENT
Start: 2022-03-14 | End: 2022-04-28

## 2022-03-15 LAB
PATH REPORT.COMMENTS IMP SPEC: NORMAL
PATH REPORT.COMMENTS IMP SPEC: NORMAL
PATH REPORT.FINAL DX SPEC: NORMAL
PATH REPORT.GROSS SPEC: NORMAL
PATH REPORT.MICROSCOPIC SPEC OTHER STN: NORMAL
PATH REPORT.RELEVANT HX SPEC: NORMAL
PHOTO IMAGE: NORMAL

## 2022-03-17 NOTE — DISCHARGE SUMMARY
Physician Discharge Summary    Primary Care Physician:  Shane Gonzalez    Discharge Provider: Tom Littlejohn D.O., FACS   Admission Date: 3/12/2022  Discharge Date: 3/13/2022     Primary Diagnosis at Discharge:   Patient Active Problem List   Diagnosis     Acute appendicitis      Disposition: Home or Self Care  Condition at Discharge: Stable     Physical Exam:   /64   Pulse 50   Temp 98.6  F (37  C) (Temporal)   Resp 16   Wt 86.2 kg (190 lb)   SpO2 99%   BMI 25.07 kg/m     CTA  RRR  Ab Soft  Dressings/wounds are Clean and Dry      Surgery: Laparoscopic appendectomy      Discharge Medications:      Medication List      Started    docusate sodium 100 MG capsule  Commonly known as: COLACE  100 mg, Oral, 2 TIMES DAILY     oxyCODONE 5 MG tablet  Commonly known as: ROXICODONE  5-10 mg, Oral, EVERY 4 HOURS PRN            Discharge Instructions:  Follow up appointment with Primary Care Physician: Shane Gonzalez  Follow up appointment with Dr. Littlejohn in: 2 weeks  Follow up test / procedure to do as outpatient:   Diet: Regular  Activity: Ad Rahel  Restrictions: No lifting over 20 pounds  Wound / drain care: None  The following tests have been done with results pending: None     Hospital Summary:   Erasto Torres was admitted for acute appendicitis and underwent an uncomplicated laparoscopic appendectomy.  he recovered on the Med/Surg floor, diet was advanced and his pain was controlled with oral medications.  On POD #1 he was discharged home without any complications.          Tom Littlejohn D.O., FACS  [facility]  [phone]

## 2022-04-28 ENCOUNTER — OFFICE VISIT (OUTPATIENT)
Dept: FAMILY MEDICINE | Facility: CLINIC | Age: 37
End: 2022-04-28
Payer: COMMERCIAL

## 2022-04-28 VITALS
SYSTOLIC BLOOD PRESSURE: 127 MMHG | DIASTOLIC BLOOD PRESSURE: 77 MMHG | WEIGHT: 189 LBS | BODY MASS INDEX: 25.05 KG/M2 | HEART RATE: 65 BPM | TEMPERATURE: 98.1 F | HEIGHT: 73 IN | RESPIRATION RATE: 14 BRPM

## 2022-04-28 DIAGNOSIS — Z00.00 ROUTINE GENERAL MEDICAL EXAMINATION AT A HEALTH CARE FACILITY: ICD-10-CM

## 2022-04-28 DIAGNOSIS — Z00.00 ENCOUNTER FOR GENERAL ADULT MEDICAL EXAMINATION WITHOUT ABNORMAL FINDINGS: Primary | ICD-10-CM

## 2022-04-28 PROBLEM — K35.80 ACUTE APPENDICITIS: Status: RESOLVED | Noted: 2022-03-12 | Resolved: 2022-04-28

## 2022-04-28 PROCEDURE — 99395 PREV VISIT EST AGE 18-39: CPT | Performed by: FAMILY MEDICINE

## 2022-04-28 NOTE — PROGRESS NOTES
SUBJECTIVE:   CC: Erasto Torres is an 36 year old male who presents for preventative health visit.     Patient has been advised of split billing requirements and indicates understanding: Yes    Patient has no chronic medical problems. He had a laparoscopic appendectomy last month for symptomatic appendicitis with no complications. Miguelangel feels well-recovered and is not taking any medication.    Today's PHQ-2 Score:   PHQ-2 ( 1999 Pfizer) 4/25/2022   Q1: Little interest or pleasure in doing things 0   Q2: Feeling down, depressed or hopeless 0   PHQ-2 Score 0   PHQ-2 Total Score (12-17 Years)- Positive if 3 or more points; Administer PHQ-A if positive -   Q1: Little interest or pleasure in doing things Not at all   Q2: Feeling down, depressed or hopeless Not at all   PHQ-2 Score 0       Abuse: Current or Past(Physical, Sexual or Emotional)- No  Do you feel safe in your environment? Yes    Have you ever done Advance Care Planning? (For example, a Health Directive, POLST, or a discussion with a medical provider or your loved ones about your wishes): patient would like his wife to be his decision maker    Social History     Tobacco Use    Smoking status: Never Smoker    Smokeless tobacco: Never Used    Tobacco comment: no second hand smoke exposure   Substance Use Topics    Alcohol use: Yes     Alcohol/week: 2.0 standard drinks     Types: 1 Cans of beer, 1 Standard drinks or equivalent per week       Alcohol Use 4/25/2022   Prescreen: >3 drinks/day or >7 drinks/week? No       Reviewed and updated as needed this visit by clinical staff   Tobacco  Allergies  Meds   Med Hx  Surg Hx  Fam Hx           Family History   Problem Relation Age of Onset    No Known Problems Daughter     Hypertension Mother     Hyperlipidemia Mother     Diabetes Maternal Grandmother        Reviewed and updated as needed this visit by Provider      Review of Systems  GEN: no fevers or chills   ENT: no sinus concerns, normal hearing and vision,  "reading glasses  RESP: no cough or wheezing   CV: no dyspnea, palpitations, edema or chest pain   GI: no nausea, vomiting, diarrhea, or constipation   : normal urination   ENDO: no hot or cold intolerance, no polydipsia or polyuria   MS: no myalgias or arthralgias   DERM: no rash or bruising   PSYCH: no depression concerns      OBJECTIVE:   /77 (BP Location: Right arm, Patient Position: Sitting, Cuff Size: Adult Regular)   Pulse 65   Temp 98.1  F (36.7  C) (Temporal)   Resp 14   Ht 1.85 m (6' 0.84\")   Wt 85.7 kg (189 lb)   BMI 25.05 kg/m      Physical Exam  GENERAL: healthy, alert and no distress  EYES: Eyes grossly normal to inspection, PERRL and conjunctivae and sclerae normal  HENT: ear canals and TM's normal, nose and mouth without ulcers or lesions  NECK: no adenopathy, no asymmetry, masses, or scars and thyroid normal to palpation  RESP: lungs clear to auscultation - no rales, rhonchi or wheezes  CV: regular rate and rhythm, normal S1 S2, no S3 or S4, no murmur, click or rub, no peripheral edema and peripheral pulses strong  ABDOMEN: soft, nontender, no hepatosplenomegaly, no masses and bowel sounds normal  MS: no gross musculoskeletal defects noted, no edema  SKIN: no suspicious lesions or rashes  NEURO: Normal strength and tone, mentation intact and speech normal  PSYCH: mentation appears normal, affect normal/bright      ASSESSMENT/PLAN:       ICD-10-CM    1. Encounter for general adult medical examination without abnormal findings  Z00.00    2. Routine general medical examination at a health care facility  Z00.00        Patient has been advised of split billing requirements and indicates understanding: Yes    COUNSELING:   Reviewed preventive health counseling, as reflected in patient instructions  Special attention given to:        Regular exercise    Estimated body mass index is 25.05 kg/m  as calculated from the following:    Height as of this encounter: 1.85 m (6' 0.84\").    Weight as of " this encounter: 85.7 kg (189 lb).      He reports that he has never smoked. He has never used smokeless tobacco.      Counseling Resources:  ATP IV Guidelines  Pooled Cohorts Equation Calculator  FRAX Risk Assessment  ICSI Preventive Guidelines  Dietary Guidelines for Americans, 2010  USDA's MyPlate  ASA Prophylaxis  Lung CA Screening    Kalie Shankar, Student NP    Shane Gonzalez MD  [facility]  Answers for HPI/ROS submitted by the patient on 4/25/2022  Frequency of exercise:: 2-3 days/week  Getting at least 3 servings of Calcium per day:: Yes  Diet:: Regular (no restrictions)  Taking medications regularly:: Yes  Medication side effects:: Not applicable  Bi-annual eye exam:: Yes  Dental care twice a year:: Yes  Sleep apnea or symptoms of sleep apnea:: None  Additional concerns today:: No  Duration of exercise:: 15-30 minutes

## 2022-06-08 ENCOUNTER — MYC MEDICAL ADVICE (OUTPATIENT)
Dept: FAMILY MEDICINE | Facility: CLINIC | Age: 37
End: 2022-06-08
Payer: COMMERCIAL

## 2022-06-08 NOTE — TELEPHONE ENCOUNTER
See My Chart message below  Contacted patient and he has right hip pain radiating down to his right upper leg.  Patient's pain is 7/10 and pain comes and goes.  No numbness or tingling or other symptoms  No imaging done and patient not seen by PCP for hip pain    Gave patient M [facility] phone number to schedule an appointment today. Was [facility]    Yamini Larose RN  [facility]      [facility] [place]  Kelvin DURANT.   My right hip has been very uncomfortable  and causing pain down my leg. A similar thing happened previously and PT really helped. I set up an appointment at [facility] and when they verified insurance they were told I need an order. Do I need to see you first to get that, or can you just order it?     I d you can just send it, their fax number is [phone].     Thanks let me know if you have any questions   Erasto

## 2022-09-18 ENCOUNTER — HEALTH MAINTENANCE LETTER (OUTPATIENT)
Age: 37
End: 2022-09-18

## 2023-04-20 ENCOUNTER — PATIENT OUTREACH (OUTPATIENT)
Dept: CARE COORDINATION | Facility: CLINIC | Age: 38
End: 2023-04-20
Payer: COMMERCIAL

## 2023-06-04 ENCOUNTER — HEALTH MAINTENANCE LETTER (OUTPATIENT)
Age: 38
End: 2023-06-04

## 2023-08-08 ASSESSMENT — ENCOUNTER SYMPTOMS
HEADACHES: 0
JOINT SWELLING: 0
SORE THROAT: 0
NAUSEA: 0
DIZZINESS: 0
NERVOUS/ANXIOUS: 0
COUGH: 0
PALPITATIONS: 0
MYALGIAS: 0
ARTHRALGIAS: 0
EYE PAIN: 0
HEARTBURN: 0
WEAKNESS: 0
HEMATOCHEZIA: 0
CHILLS: 0
CONSTIPATION: 0
FREQUENCY: 0
PARESTHESIAS: 0
HEMATURIA: 0
DYSURIA: 0
SHORTNESS OF BREATH: 0
ABDOMINAL PAIN: 0
DIARRHEA: 0
FEVER: 0

## 2023-08-15 ENCOUNTER — OFFICE VISIT (OUTPATIENT)
Dept: FAMILY MEDICINE | Facility: CLINIC | Age: 38
End: 2023-08-15
Payer: COMMERCIAL

## 2023-08-15 VITALS
TEMPERATURE: 97.5 F | RESPIRATION RATE: 16 BRPM | HEART RATE: 52 BPM | WEIGHT: 200 LBS | OXYGEN SATURATION: 98 % | DIASTOLIC BLOOD PRESSURE: 77 MMHG | SYSTOLIC BLOOD PRESSURE: 127 MMHG | BODY MASS INDEX: 27.09 KG/M2 | HEIGHT: 72 IN

## 2023-08-15 DIAGNOSIS — Z00.00 ROUTINE GENERAL MEDICAL EXAMINATION AT A HEALTH CARE FACILITY: Primary | ICD-10-CM

## 2023-08-15 PROCEDURE — 99395 PREV VISIT EST AGE 18-39: CPT | Performed by: FAMILY MEDICINE

## 2023-08-15 ASSESSMENT — ENCOUNTER SYMPTOMS
HEARTBURN: 0
DIARRHEA: 0
MYALGIAS: 0
WEAKNESS: 0
SHORTNESS OF BREATH: 0
SORE THROAT: 0
EYE PAIN: 0
HEMATURIA: 0
DIZZINESS: 0
COUGH: 0
HIP PAIN: 1
ABDOMINAL PAIN: 0
PALPITATIONS: 0
HEADACHES: 0
CHILLS: 0
CONSTIPATION: 0
NAUSEA: 0
DYSURIA: 0
JOINT SWELLING: 0
HEMATOCHEZIA: 0
PARESTHESIAS: 0
NERVOUS/ANXIOUS: 0
FEVER: 0
ARTHRALGIAS: 0
FREQUENCY: 0

## 2023-08-15 NOTE — PROGRESS NOTES
SUBJECTIVE:   CC: Erasto is an 37 year old who presents for preventative health visit.       8/15/2023     2:49 PM   Additional Questions   Roomed by hser   Accompanied by daughter       Healthy Habits:     Getting at least 3 servings of Calcium per day:  Yes    Bi-annual eye exam:  Yes    Dental care twice a year:  Yes    Sleep apnea or symptoms of sleep apnea:  None    Diet:  Regular (no restrictions)    Frequency of exercise:  2-3 days/week    Duration of exercise:  15-30 minutes    Taking medications regularly:  Yes    Medication side effects:  None    Additional concerns today:  Yes      Today's PHQ-2 Score:       8/15/2023     2:44 PM   PHQ-2 ( 1999 Pfizer)   Q1: Little interest or pleasure in doing things 0   Q2: Feeling down, depressed or hopeless 1   PHQ-2 Score 1   Q1: Little interest or pleasure in doing things Not at all   Q2: Feeling down, depressed or hopeless Several days   PHQ-2 Score 1       Left foot pain.  Inversion left ankle.  Metatarsal head pain.  Now a few weeks old already.  Almost back to full mobility  Wearing supportive shoe.    Have you ever done Advance Care Planning? (For example, a Health Directive, POLST, or a discussion with a medical provider or your loved ones about your wishes): No, advance care planning information given to patient to review.  Patient declined advance care planning discussion at this time.    Social History     Tobacco Use    Smoking status: Never    Smokeless tobacco: Never    Tobacco comments:     no second hand smoke exposure   Substance Use Topics    Alcohol use: Yes     Alcohol/week: 2.0 standard drinks of alcohol     Types: 1 Cans of beer, 1 Standard drinks or equivalent per week             8/8/2023     8:57 PM   Alcohol Use   Prescreen: >3 drinks/day or >7 drinks/week? No       BP Readings from Last 3 Encounters:   08/15/23 127/77   04/28/22 127/77   03/13/22 111/64    Wt Readings from Last 3 Encounters:   08/15/23 90.7 kg (200 lb)   04/28/22 85.7 kg  (189 lb)   03/12/22 86.2 kg (190 lb)                  Patient Active Problem List   Diagnosis   (none) - all problems resolved or deleted     Past Surgical History:   Procedure Laterality Date    ANKLE SURGERY      LAPAROSCOPIC APPENDECTOMY N/A 3/13/2022    Procedure: APPENDECTOMY, LAPAROSCOPIC;  Surgeon: Goldy Littlejohn DO;  Location: [facility] OR    ORIF FINGER / THUMB FRACTURE Right     ORTHOPEDIC SURGERY         Social History     Tobacco Use    Smoking status: Never    Smokeless tobacco: Never    Tobacco comments:     no second hand smoke exposure   Substance Use Topics    Alcohol use: Yes     Alcohol/week: 2.0 standard drinks of alcohol     Types: 1 Cans of beer, 1 Standard drinks or equivalent per week     Family History   Problem Relation Age of Onset    No Known Problems Daughter     Hypertension Mother     Hyperlipidemia Mother     Diabetes Maternal Grandmother          No current outpatient medications on file.     Allergies   Allergen Reactions    Amoxicillin-Pot Clavulanate Nausea    No Known Allergies      After dental procedure had an unknown antibiotic that caused severe stomach pains       Reviewed and updated as needed this visit by clinical staff   Tobacco  Allergies  Meds   Med Hx  Surg Hx  Fam Hx          Reviewed and updated as needed this visit by Provider       Med Hx  Surg Hx  Fam Hx         Past Medical History:   Diagnosis Date    Mononucleosis syndrome       Past Surgical History:   Procedure Laterality Date    ANKLE SURGERY      LAPAROSCOPIC APPENDECTOMY N/A 3/13/2022    Procedure: APPENDECTOMY, LAPAROSCOPIC;  Surgeon: Goldy Littlejohn DO;  Location: [facility] OR    ORIF FINGER / THUMB FRACTURE Right     ORTHOPEDIC SURGERY         Review of Systems   Constitutional:  Negative for chills and fever.   HENT:  Negative for congestion, ear pain, hearing loss and sore throat.    Eyes:  Negative for pain and visual disturbance.   Respiratory:  Negative for cough and  "shortness of breath.    Cardiovascular:  Negative for chest pain, palpitations and peripheral edema.   Gastrointestinal:  Negative for abdominal pain, constipation, diarrhea, heartburn, hematochezia and nausea.   Genitourinary:  Negative for dysuria, frequency, genital sores, hematuria, impotence, penile discharge and urgency.   Musculoskeletal:  Negative for arthralgias, joint swelling and myalgias.   Skin:  Negative for rash.   Neurological:  Negative for dizziness, weakness, headaches and paresthesias.   Psychiatric/Behavioral:  Negative for mood changes. The patient is not nervous/anxious.      OBJECTIVE:   /77 (BP Location: Left arm, Patient Position: Sitting, Cuff Size: Adult Regular)   Pulse 52   Temp 97.5  F (36.4  C) (Temporal)   Resp 16   Ht 1.84 m (6' 0.44\")   Wt 90.7 kg (200 lb)   SpO2 98%   BMI 26.80 kg/m      Physical Exam  Gen:   Alert, not distressed  Head:   Normocephalic, without obvious abnormality, atraumatic  Eyes:   PERRL, conjunctiva/corneas clear, EOM's intact  Ears:   Normal tympanic membranes and external ear canals  Nose:    Mucosa normal, no drainage or sinus tenderness  Throat:    No erythema or exudates  Neck:    No adenopathy no nodules in thyroid, normal ROM  Lungs:    Clear to auscultation bilaterally, respirations unlabored  Chest wall:  No tenderness or deformity  Heart:     Regular, normal S1 and S2, no murmur, gallop or rub  Abdomen:  Soft, non-tender, normal bowel sounds, no masses, no organomegaly  Back:    Symmetric, no curvature, ROM normal, no CVA tenderness  Extremities:   Extremities normal, atraumatic, no cyanosis or edema  Skin:     Skin color, texture, turgor normal, no rashes or lesions  Lymph nodes:   Cervical and supraclavicular nodes normal  Neurologic:   CNII-XII intact.   DTRs normal and symmetric.  Symmetric strength and sensation.      Foot exam, Normal CMS.  Small ecchymosis midfoot 4th base metatarsal.  No tenderness at base of 5th " "metatarsal.    ASSESSMENT/PLAN:   Erasto was seen today for physical and hip pain.    Diagnoses and all orders for this visit:    Routine general medical examination at a health care facility    Other orders  -     REVIEW OF HEALTH MAINTENANCE PROTOCOL ORDERS  -     PRIMARY CARE FOLLOW-UP SCHEDULING; Future    Foot pain is ok to monitor since improving.  Continue hard soled supportive shoe.  Let us know if now better.    Has had some hip rotation concerns perviously.  Doing ok now, but might want PT referral in the future.    COUNSELING:   Reviewed preventive health counseling, as reflected in patient instructions       Regular exercise       Healthy diet/nutrition      BMI:   Estimated body mass index is 26.8 kg/m  as calculated from the following:    Height as of this encounter: 1.84 m (6' 0.44\").    Weight as of this encounter: 90.7 kg (200 lb).     He reports that he has never smoked. He has never used smokeless tobacco.            Shane Gonzalez MD  [facility]    Prior to immunization administration, verified patients identity using patient s name and date of birth. Please see Immunization Activity for additional information.     Screening Questionnaire for Adult Immunization    Are you sick today?   No   Do you have allergies to medications, food, a vaccine component or latex?   Yes   Have you ever had a serious reaction after receiving a vaccination?   No   Do you have a long-term health problem with heart, lung, kidney, or metabolic disease (e.g., diabetes), asthma, a blood disorder, no spleen, complement component deficiency, a cochlear implant, or a spinal fluid leak?  Are you on long-term aspirin therapy?   No   Do you have cancer, leukemia, HIV/AIDS, or any other immune system problem?   No   Do you have a parent, brother, or sister with an immune system problem?   No   In the past 3 months, have you taken medications that affect  your immune system, such as prednisone, other " steroids, or anticancer drugs; drugs for the treatment of rheumatoid arthritis, Crohn s disease, or psoriasis; or have you had radiation treatments?   No   Have you had a seizure, or a brain or other nervous system problem?   No   During the past year, have you received a transfusion of blood or blood    products, or been given immune (gamma) globulin or antiviral drug?   No   For women: Are you pregnant or is there a chance you could become       pregnant during the next month?   No   Have you received any vaccinations in the past 4 weeks?   No     Immunization questionnaire was positive for at least one answer.  Notified provider.      Patient instructed to remain in clinic for 15 minutes afterwards, and to report any adverse reactions.     Screening performed by Pedro Aguilera MA on 8/15/2023 at 2:52 PM.

## 2024-07-16 ENCOUNTER — PATIENT OUTREACH (OUTPATIENT)
Dept: CARE COORDINATION | Facility: CLINIC | Age: 39
End: 2024-07-16
Payer: COMMERCIAL

## 2024-07-30 ENCOUNTER — PATIENT OUTREACH (OUTPATIENT)
Dept: CARE COORDINATION | Facility: CLINIC | Age: 39
End: 2024-07-30
Payer: COMMERCIAL

## 2024-08-09 ENCOUNTER — TRANSFERRED RECORDS (OUTPATIENT)
Dept: HEALTH INFORMATION MANAGEMENT | Facility: CLINIC | Age: 39
End: 2024-08-09
Payer: COMMERCIAL

## 2024-08-27 ENCOUNTER — TRANSFERRED RECORDS (OUTPATIENT)
Dept: HEALTH INFORMATION MANAGEMENT | Facility: CLINIC | Age: 39
End: 2024-08-27
Payer: COMMERCIAL

## 2024-09-05 ENCOUNTER — OFFICE VISIT (OUTPATIENT)
Dept: FAMILY MEDICINE | Facility: CLINIC | Age: 39
End: 2024-09-05
Payer: COMMERCIAL

## 2024-09-05 VITALS
OXYGEN SATURATION: 99 % | TEMPERATURE: 97.9 F | SYSTOLIC BLOOD PRESSURE: 132 MMHG | WEIGHT: 205 LBS | HEIGHT: 72 IN | DIASTOLIC BLOOD PRESSURE: 73 MMHG | HEART RATE: 62 BPM | RESPIRATION RATE: 16 BRPM | BODY MASS INDEX: 27.77 KG/M2

## 2024-09-05 DIAGNOSIS — Z00.00 ROUTINE GENERAL MEDICAL EXAMINATION AT A HEALTH CARE FACILITY: Primary | ICD-10-CM

## 2024-09-05 DIAGNOSIS — K60.2 ANAL FISSURE: ICD-10-CM

## 2024-09-05 PROCEDURE — 99395 PREV VISIT EST AGE 18-39: CPT | Performed by: FAMILY MEDICINE

## 2024-09-05 NOTE — PATIENT INSTRUCTIONS
Patient Education   Preventive Care Advice   This is general advice given by our system to help you stay healthy. However, your care team may have specific advice just for you. Please talk to your care team about your preventive care needs.  Nutrition  Eat 5 or more servings of fruits and vegetables each day.  Try wheat bread, brown rice and whole grain pasta (instead of white bread, rice, and pasta).  Get enough calcium and vitamin D. Check the label on foods and aim for 100% of the RDA (recommended daily allowance).  Lifestyle  Exercise at least 150 minutes each week  (30 minutes a day, 5 days a week).  Do muscle strengthening activities 2 days a week. These help control your weight and prevent disease.  No smoking.  Wear sunscreen to prevent skin cancer.  Have a dental exam and cleaning every 6 months.  Yearly exams  See your health care team every year to talk about:  Any changes in your health.  Any medicines your care team has prescribed.  Preventive care, family planning, and ways to prevent chronic diseases.  Shots (vaccines)   HPV shots (up to age 26), if you've never had them before.  Hepatitis B shots (up to age 59), if you've never had them before.  COVID-19 shot: Get this shot when it's due.  Flu shot: Get a flu shot every year.  Tetanus shot: Get a tetanus shot every 10 years.  Pneumococcal, hepatitis A, and RSV shots: Ask your care team if you need these based on your risk.  Shingles shot (for age 50 and up)  General health tests  Diabetes screening:  Starting at age 35, Get screened for diabetes at least every 3 years.  If you are younger than age 35, ask your care team if you should be screened for diabetes.  Cholesterol test: At age 39, start having a cholesterol test every 5 years, or more often if advised.  Bone density scan (DEXA): At age 50, ask your care team if you should have this scan for osteoporosis (brittle bones).  Hepatitis C: Get tested at least once in your life.  STIs (sexually  transmitted infections)  Before age 24: Ask your care team if you should be screened for STIs.  After age 24: Get screened for STIs if you're at risk. You are at risk for STIs (including HIV) if:  You are sexually active with more than one person.  You don't use condoms every time.  You or a partner was diagnosed with a sexually transmitted infection.  If you are at risk for HIV, ask about PrEP medicine to prevent HIV.  Get tested for HIV at least once in your life, whether you are at risk for HIV or not.  Cancer screening tests  Cervical cancer screening: If you have a cervix, begin getting regular cervical cancer screening tests starting at age 21.  Breast cancer scan (mammogram): If you've ever had breasts, begin having regular mammograms starting at age 40. This is a scan to check for breast cancer.  Colon cancer screening: It is important to start screening for colon cancer at age 45.  Have a colonoscopy test every 10 years (or more often if you're at risk) Or, ask your provider about stool tests like a FIT test every year or Cologuard test every 3 years.  To learn more about your testing options, visit:   .  For help making a decision, visit:   https://bit.ly/pt36946.  Prostate cancer screening test: If you have a prostate, ask your care team if a prostate cancer screening test (PSA) at age 55 is right for you.  Lung cancer screening: If you are a current or former smoker ages 50 to 80, ask your care team if ongoing lung cancer screenings are right for you.  For informational purposes only. Not to replace the advice of your health care provider. Copyright   2023 [employer]. All rights reserved. Clinically reviewed by the [facility]. Sciona [id number] - REV 01/24.

## 2024-09-05 NOTE — PROGRESS NOTES
Preventive Care Visit  [facility]  Shane Gonzalez MD, Family Medicine  Sep 5, 2024      Assessment & Plan     Routine general medical examination at a health care facility    Anal fissure  - Adult Colorectal Surgery  Referral - [facility] ([facility]    BMI  Estimated body mass index is 27.8 kg/m  as calculated from the following:    Height as of this encounter: 1.829 m (6').    Weight as of this encounter: 93 kg (205 lb).     Counseling  Appropriate preventive services were addressed with this patient via screening, questionnaire, or discussion as appropriate for fall prevention, nutrition, physical activity, Tobacco-use cessation, social engagement, weight loss and cognition.  Checklist reviewing preventive services available has been given to the patient.  Reviewed patient's diet, addressing concerns and/or questions.   He is at risk for lack of exercise and has been provided with information to increase physical activity for the benefit of his well-being.   He is at risk for psychosocial distress and has been provided with information to reduce risk.     Madie Maurer is a 39 year old, presenting for the following:  Physical        9/5/2024     3:50 PM   Additional Questions   Roomed by hser   Accompanied by self        HPI    Saw colorectal surgery in 2017 for bleeding.  Has on and off problems.  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  Potentially hemorrhoids.  Still bothered by this.  Recommended referral back to see them again.    Mild scalp irritation.  Will try selenium shampoo.        9/3/2024   General Health   How would you rate your overall physical health? Excellent   Feel stress (tense, anxious, or unable to sleep) Only a little      (!) STRESS CONCERN      9/3/2024   Nutrition   Three or more servings of calcium each day? (!) NO   Diet: Regular (no restrictions)   How many servings of fruit and vegetables per day? (!) 2-3   How many sweetened beverages  each day? 0-1            9/3/2024   Exercise   Days per week of moderate/strenous exercise 3 days   Average minutes spent exercising at this level 30 min            9/3/2024   Social Factors   Frequency of gathering with friends or relatives Twice a week   Worry food won't last until get money to buy more No   Food not last or not have enough money for food? No   Do you have housing? (Housing is defined as stable permanent housing and does not include staying ouside in a car, in a tent, in an abandoned building, in an overnight shelter, or couch-surfing.) Yes   Are you worried about losing your housing? No   Lack of transportation? No   Unable to get utilities (heat,electricity)? No            9/3/2024   Dental   Dentist two times every year? Yes            9/3/2024   TB Screening   Were you born outside of the [place]? No        Today's PHQ-2 Score:       9/5/2024     3:43 PM   PHQ-2 ( 1999 [employer])   Q1: Little interest or pleasure in doing things 0   Q2: Feeling down, depressed or hopeless 0   PHQ-2 Score 0   Q1: Little interest or pleasure in doing things Not at all   Q2: Feeling down, depressed or hopeless Not at all   PHQ-2 Score 0           9/3/2024   Substance Use   Alcohol more than 3/day or more than 7/wk No   Do you use any other substances recreationally? No      Social History     Tobacco Use    Smoking status: Never    Smokeless tobacco: Never    Tobacco comments:     no second hand smoke exposure   Vaping Use    Vaping status: Never Used   Substance Use Topics    Alcohol use: Yes     Alcohol/week: 2.0 standard drinks of alcohol     Types: 1 Cans of beer, 1 Standard drinks or equivalent per week    Drug use: Never           9/3/2024   STI Screening   New sexual partner(s) since last STI/HIV test? No          9/3/2024   Contraception/Family Planning   Questions about contraception or family planning (!) YES considering vasectomy             Reviewed and updated as needed this visit by Provider       Med Hx   Surg Hx  Fam Hx            Past Medical History:   Diagnosis Date    Mononucleosis syndrome      Past Surgical History:   Procedure Laterality Date    ANKLE SURGERY      LAPAROSCOPIC APPENDECTOMY N/A 3/13/2022    Procedure: APPENDECTOMY, LAPAROSCOPIC;  Surgeon: Goldy Littlejohn DO;  Location: [facility] OR    ORIF FINGER / THUMB FRACTURE Right     ORTHOPEDIC SURGERY       BP Readings from Last 3 Encounters:   09/05/24 132/73   08/15/23 127/77   04/28/22 127/77    Wt Readings from Last 3 Encounters:   09/05/24 93 kg (205 lb)   08/15/23 90.7 kg (200 lb)   04/28/22 85.7 kg (189 lb)                  Patient Active Problem List   Diagnosis   (none) - all problems resolved or deleted     Past Surgical History:   Procedure Laterality Date    ANKLE SURGERY      LAPAROSCOPIC APPENDECTOMY N/A 3/13/2022    Procedure: APPENDECTOMY, LAPAROSCOPIC;  Surgeon: Goldy Littlejohn DO;  Location: [facility] OR    ORIF FINGER / THUMB FRACTURE Right     ORTHOPEDIC SURGERY         Social History     Tobacco Use    Smoking status: Never    Smokeless tobacco: Never    Tobacco comments:     no second hand smoke exposure   Substance Use Topics    Alcohol use: Yes     Alcohol/week: 2.0 standard drinks of alcohol     Types: 1 Cans of beer, 1 Standard drinks or equivalent per week     Family History   Problem Relation Age of Onset    Hypertension Mother     Hyperlipidemia Mother     Alcoholism Mother     Diabetes Maternal Grandmother     No Known Problems Daughter          No current outpatient medications on file.     Allergies   Allergen Reactions    Amoxicillin-Pot Clavulanate Nausea    No Known Allergies      After dental procedure had an unknown antibiotic that caused severe stomach pains     Recent Labs   Lab Test 03/12/22 2113 10/13/16  0919   LDL  --  75   HDL  --  39*   TRIG  --  71   ALT 11  --    CR 0.87  --    GFRESTIMATED >90  --    POTASSIUM 3.5  --          Objective    Exam  /73 (BP Location: Right arm,  Patient Position: Sitting, Cuff Size: Adult Regular)   Pulse 62   Temp 97.9  F (36.6  C) (Temporal)   Resp 16   Ht 1.829 m (6')   Wt 93 kg (205 lb)   SpO2 99%   BMI 27.80 kg/m     Estimated body mass index is 27.8 kg/m  as calculated from the following:    Height as of this encounter: 1.829 m (6').    Weight as of this encounter: 93 kg (205 lb).    Physical Exam  Gen:   Alert, not distressed  Head:   Normocephalic, without obvious abnormality, atraumatic  Eyes:   PERRL, conjunctiva/corneas clear, EOM's intact  Ears:   Normal tympanic membranes and external ear canals  Nose:    Mucosa normal, no drainage or sinus tenderness  Throat:    No erythema or exudates  Neck:    No adenopathy no nodules in thyroid, normal ROM  Lungs:    Clear to auscultation bilaterally, respirations unlabored  Chest wall:  No tenderness or deformity  Heart:     Regular, normal S1 and S2, no murmur, gallop or rub  Abdomen:  Soft, non-tender, normal bowel sounds, no masses, no organomegaly  Back:    Symmetric, no curvature, ROM normal, no CVA tenderness  Extremities:   Extremities normal, atraumatic, no cyanosis or edema  Skin:     Skin color, texture, turgor normal, no rashes or lesions  Lymph nodes:   Cervical and supraclavicular nodes normal  Neurologic:   CNII-XII intact.   DTRs normal and symmetric.  Symmetric strength and sensation.    Signed Electronically by: Shane Gonzalez MD        Prior to immunization administration, verified patients identity using patient s name and date of birth. Please see Immunization Activity for additional information.     Screening Questionnaire for Adult Immunization    Are you sick today?   No   Do you have allergies to medications, food, a vaccine component or latex?   No   Have you ever had a serious reaction after receiving a vaccination?   No   Do you have a long-term health problem with heart, lung, kidney, or metabolic disease (e.g., diabetes), asthma, a blood disorder, no spleen, complement  component deficiency, a cochlear implant, or a spinal fluid leak?  Are you on long-term aspirin therapy?   No   Do you have cancer, leukemia, HIV/AIDS, or any other immune system problem?   No   Do you have a parent, brother, or sister with an immune system problem?   No   In the past 3 months, have you taken medications that affect  your immune system, such as prednisone, other steroids, or anticancer drugs; drugs for the treatment of rheumatoid arthritis, Crohn s disease, or psoriasis; or have you had radiation treatments?   No   Have you had a seizure, or a brain or other nervous system problem?   No   During the past year, have you received a transfusion of blood or blood    products, or been given immune (gamma) globulin or antiviral drug?   No   For women: Are you pregnant or is there a chance you could become       pregnant during the next month?   No   Have you received any vaccinations in the past 4 weeks?   No     Immunization questionnaire answers were all negative.      Patient instructed to remain in clinic for 15 minutes afterwards, and to report any adverse reactions.     Screening performed by Pedro Aguilera MA on 9/5/2024 at 3:53 PM.

## 2024-09-06 ENCOUNTER — TRANSFERRED RECORDS (OUTPATIENT)
Dept: HEALTH INFORMATION MANAGEMENT | Facility: CLINIC | Age: 39
End: 2024-09-06
Payer: COMMERCIAL

## 2025-08-06 ENCOUNTER — PATIENT OUTREACH (OUTPATIENT)
Dept: CARE COORDINATION | Facility: CLINIC | Age: 40
End: 2025-08-06
Payer: COMMERCIAL

## 2025-08-21 ENCOUNTER — PATIENT OUTREACH (OUTPATIENT)
Dept: CARE COORDINATION | Facility: CLINIC | Age: 40
End: 2025-08-21
Payer: COMMERCIAL

## (undated) DEVICE — PLATE GROUNDING ADULT W/CORD 9165L

## (undated) DEVICE — DECANTER VIAL 2006S

## (undated) DEVICE — ENDO TROCAR FIRST ENTRY KII FIOS Z-THRD 05X100MM CTF03

## (undated) DEVICE — TUBING SMOKE EVAC PNEUMOCLEAR HIGH FLOW 0620050250

## (undated) DEVICE — BLADE KNIFE SURG 11 371111

## (undated) DEVICE — SU VICRYL+ 0 27 UR6 VLT VCP603H

## (undated) DEVICE — DRSG STERI STRIP 1/2X4" R1547

## (undated) DEVICE — STPL ENDO LINEAR CUT ARTICULATING 45MM ATS45

## (undated) DEVICE — NDL INSUFFLATION 13GA 120MM C2201

## (undated) DEVICE — SOL WATER IRRIG 1000ML BOTTLE 2F7114

## (undated) DEVICE — ENDO TROCAR SLEEVE KII Z-THREADED 05X100MM CTS02

## (undated) DEVICE — SUCTION MANIFOLD NEPTUNE 2 SYS 1 PORT 702-025-000

## (undated) DEVICE — Device

## (undated) DEVICE — GLOVE BIOGEL PI ORTHOPRO SZ 7.5 47675

## (undated) DEVICE — DRESSING BANDAID SURFING CAT ABN4075D

## (undated) DEVICE — CUSTOM PACK LAP CHOLE SBA5BLCHEA

## (undated) DEVICE — PREP CHLORAPREP 26ML TINTED HI-LITE ORANGE 930815

## (undated) DEVICE — SU MONOCRYL+ 4-0 18IN PS2 UND MCP496G

## (undated) DEVICE — TUBING LAP SUCT/IRRIG STRYKER 250070500

## (undated) DEVICE — BASIN EMESIS STERILE  SSK9005A

## (undated) DEVICE — STPL RELOAD REG TISSUE ECHELON 45 X 3.6MM BLUE GST45B

## (undated) DEVICE — ENDO TROCAR FIRST ENTRY KII FIOS Z-THRD 12X100MM CTF73

## (undated) DEVICE — ENDO POUCH UNIV RETRIEVAL SYSTEM INZII 10MM CD001

## (undated) RX ORDER — ONDANSETRON 2 MG/ML
INJECTION INTRAMUSCULAR; INTRAVENOUS
Status: DISPENSED
Start: 2022-03-13

## (undated) RX ORDER — PROPOFOL 10 MG/ML
INJECTION, EMULSION INTRAVENOUS
Status: DISPENSED
Start: 2022-03-13

## (undated) RX ORDER — DEXAMETHASONE SODIUM PHOSPHATE 10 MG/ML
INJECTION, EMULSION INTRAMUSCULAR; INTRAVENOUS
Status: DISPENSED
Start: 2022-03-13

## (undated) RX ORDER — BUPIVACAINE HYDROCHLORIDE 2.5 MG/ML
INJECTION, SOLUTION INFILTRATION; PERINEURAL
Status: DISPENSED
Start: 2022-03-13

## (undated) RX ORDER — FENTANYL CITRATE 50 UG/ML
INJECTION, SOLUTION INTRAMUSCULAR; INTRAVENOUS
Status: DISPENSED
Start: 2022-03-13